# Patient Record
Sex: MALE | Race: WHITE | Employment: FULL TIME | URBAN - METROPOLITAN AREA
[De-identification: names, ages, dates, MRNs, and addresses within clinical notes are randomized per-mention and may not be internally consistent; named-entity substitution may affect disease eponyms.]

---

## 2020-01-20 ENCOUNTER — HOSPITAL ENCOUNTER (OUTPATIENT)
Dept: PHYSICAL THERAPY | Age: 35
Discharge: HOME | End: 2020-01-20
Payer: COMMERCIAL

## 2020-01-20 PROCEDURE — 97140 MANUAL THERAPY 1/> REGIONS: CPT | Performed by: PHYSICAL THERAPIST

## 2020-01-20 PROCEDURE — 97535 SELF CARE MNGMENT TRAINING: CPT | Performed by: PHYSICAL THERAPIST

## 2020-01-20 PROCEDURE — 97161 PT EVAL LOW COMPLEX 20 MIN: CPT | Performed by: PHYSICAL THERAPIST

## 2020-01-20 NOTE — PROGRESS NOTES
[facility] PHYSICAL THERAPY  [address] [place] [address], [address],[place], [address] - Phone: [phone]  Fax: [phone] OF CARE / [address] [facility]  Patient Name: Sachin Curry : 1985   Medical   Diagnosis: S/p R ACLR via BTB Treatment Diagnosis: Right knee pain [M25.561]   Onset Date: 20     Referral Source: Gabrielle Ana Start of Care [facility]): 2020   Prior Hospitalization: See medical history Provider #: [id number]   Prior Level of Function: full   Comorbidities: Alcohol use, tobacco use   Medications: Verified on Patient Summary List   The Plan of Care and following information is based on the information from the initial evaluation.   ===========================================================================================  Assessment / key information:  30 M arrives to clinic s/p knee surgery secondary injuring knee playing kickball in . Arrives to session in brace, B crutches NWB with ace bandage. Reports compliance with pain meds but not performing exercises. Pain levels 3-10/10 increasing with movement. arom-3-15P!, poor volitional quad set, significant bruising all belong medial thigh and ankle. All other special tests deferred.  Will attempt PT per protocol  ===========================================================================================  Eval Complexity: History HIGH Complexity :3+ comorbidities / personal factors will impact the outcome/ POC ;  Examination  HIGH Complexity : 4+ Standardized tests and measures addressing body structure, function, activity limitation and / or participation in recreation ; Presentation LOW Complexity : Stable, uncomplicated ;  Decision Making HIGH Complexity : FOTO score of 1- 25 ; Overall Complexity LOW   Problem List: pain affecting function, decrease ROM, decrease strength, edema affecting function, impaired gait/ balance, decrease ADL/ functional abilitiies, decrease activity tolerance, decrease flexibility/ joint mobility and decrease transfer abilities   Treatment Plan may include any combination of the following: Therapeutic exercise, Therapeutic activities, Neuromuscular re-education, Physical agent/modality, Gait/balance training, Manual therapy, Aquatic therapy, Patient education, Self Care training, Functional mobility training, Home safety training and Stair training  Patient / Family readiness to learn indicated by: asking questions, trying to perform skills and interest  Persons(s) to be included in education: patient (P) and family support person (FSP);list girlfriend  Barriers to Learning/Limitations: yes;  financial  Measures taken, if barriers to learning: Reduce frequency of visits   Patient Goal (s): Decrease pain, increase rom    Patient self reported health status: excellent  Rehabilitation Potential: good   Short Term Goals: To be accomplished in  2  weeks:  1. Pt will be compliant with h20  2. Pt will increase knee arom 0-65 to A with mobility   3. Pt will note daily max pain </=7/10 in order to increase participation in [address]: To be accomplished in  4  weeks:  1. Pt will increase knee flexion >/=110 to A with mobility  2. Pt will decrease daily max pain </=5/10 in order to increase participation in adls  3. Pt will increase FOTO by 18 points in order to show functional improvement   Frequency / Duration:   Patient to be seen  2  times per week for 4  weeks:  Patient / Caregiver education and instruction: self care, activity modification, brace/ splint application and exercises  Therapist Signature: Yaneth Avalos DPT, MTC  Date: 9/06/7101   Certification Period: na Time: 12:42 PM   ===========================================================================================  I certify that the above Physical Therapy Services are being furnished while the patient is under my care.   I agree with the treatment plan and certify that this therapy is necessary. Physician Signature:        Date:       Time:     Please sign and return to [facility] at [facility], [facility]. or you may fax the signed copy to [phone]. Thank you.

## 2020-01-20 NOTE — PROGRESS NOTES
Syed Young   PHYSICAL THERAPY - DAILY TREATMENT NOTE    Patient Name: Zainab Mode        Date: 2020  : 1985   yes Patient  Verified  Visit #:   1     Insurance: Payor: Jannet Barth / Plan: [id number] HMO/CHOICE PLUS/POS / Product Type: HMO /      In time: 1100 Out time: 1140   Total Treatment Time: 40     Medicare/BCBS Time Tracking (below)   Total Timed Codes (min):  na 1:1 Treatment Time:  na     TREATMENT AREA =  Right knee pain [M25.561]    SUBJECTIVE  Pain Level (on 0 to 10 scale):  9  / 10   Medication Changes/New allergies or changes in medical history, any new surgeries or procedures?    no  If yes, update Summary List   Subjective Functional Status/Changes:  []  No changes reported     See ie          OBJECTIVE      10 min Manual Therapy: Retrograde massage, pat mobs all directions, knee prom   Rationale:      increase ROM, increase tissue extensibility and decrease edema  to improve patient's ability to bend knee        10 min Self Care: Hep, brace and ace wrap use, wb and crutch use   Rationale:    increase ROM and increase strength to improve the patients ability to complete adls    Billed With/As:   [] TE   [] TA   [] Neuro   [] Self Care Patient Education: [x] Review HEP    [] Progressed/Changed HEP based on:   [] positioning   [] body mechanics   [] transfers   [] heat/ice application    [] other:      Other Objective/Functional Measures:    SC: girlfriend provided verbal understanding, pt performed hep with cues on form     Post Treatment Pain Level (on 0 to 10) scale:   9  / 10     ASSESSMENT  Assessment/Changes in Function:     See ie     []  See Progress Note/Recertification   Patient will continue to benefit from skilled PT services to modify and progress therapeutic interventions, address functional mobility deficits, address ROM deficits, address strength deficits, analyze and address soft tissue restrictions, analyze and cue movement patterns, analyze and modify body mechanics/ergonomics, assess and modify postural abnormalities, address imbalance/dizziness and instruct in home and community integration to attain remaining goals.    Progress toward goals / Updated goals:    See ie     PLAN  []  Upgrade activities as tolerated yes Continue plan of care   []  Discharge due to :    []  Other:      Therapist: Jesus Ornelas PT    Date: 1/20/2020 Time: 12:38 PM     Future Appointments   Date Time Provider Mariela Devlin   1/24/2020  8:30 AM Nevaeh Duarte, PT [facility]   1/29/2020 11:00 AM Nevaeh Duarte, PT [facility]   2/3/2020  2:30 PM [facility]   2/6/2020  2:30 PM [facility]   2/10/2020  2:30 PM [facility]   2/12/2020 10:00 AM Nevaeh Duarte, PT [facility]   2/17/2020 10:00 AM Nevaeh Duarte, PT [facility]   2/19/2020 10:30 AM Josué Germain, PT [facility]

## 2020-01-23 ENCOUNTER — HOSPITAL ENCOUNTER (OUTPATIENT)
Dept: PHYSICAL THERAPY | Age: 35
Discharge: HOME | End: 2020-01-23
Payer: COMMERCIAL

## 2020-01-23 PROCEDURE — 97014 ELECTRIC STIMULATION THERAPY: CPT | Performed by: PHYSICAL THERAPIST

## 2020-01-23 PROCEDURE — 97110 THERAPEUTIC EXERCISES: CPT | Performed by: PHYSICAL THERAPIST

## 2020-01-23 PROCEDURE — 97140 MANUAL THERAPY 1/> REGIONS: CPT | Performed by: PHYSICAL THERAPIST

## 2020-01-23 NOTE — PROGRESS NOTES
[facility] PHYSICAL THERAPY - DAILY TREATMENT NOTE    Patient Name: Columba Ventura        Date: 2020  : 1985   yes Patient  Verified  Visit #:   2     Insurance: Payor: Luba Mccormick / Plan: [employer] HMO/CHOICE PLUS/POS / Product Type: HMO /      In time: 200 Out time: 310   Total Treatment Time: 70     Medicare/BCBS Time Tracking (below)   Total Timed Codes (min):  na 1:1 Treatment Time:  na     TREATMENT AREA =  Right knee pain [M25.561]    SUBJECTIVE  Pain Level (on 0 to 10 scale):  4  / 10   Medication Changes/New allergies or changes in medical history, any new surgeries or procedures?    no  If yes, update Summary List   Subjective Functional Status/Changes:  []  No changes reported     I have iced hourly and took off the compression stocking and I have felt much better.  I have been doing the exrecises           OBJECTIVE  Modalities Rationale:     decrease pain, increase tissue extensibility and increase muscle contraction/control to improve patient's ability to complete adls   10 min [x] Estim, type/location: [facility] 4:12                                     []  att     [x]  unatt     []  w/US     [x]  w/ice    []  w/heat    min []  Mechanical Traction: type/lbs                   []  pro   []  sup   []  int   []  cont    []  before manual    []  after manual    min []  Ultrasound, settings/location:      min []  Iontophoresis w/ dexamethasone, location:                                               []  take home patch       []  in clinic    min []  Ice     []  Heat    location/position:     min []  Vasopneumatic Device, press/temp:     min []  Other:    [x] Skin assessment post-treatment (if applicable):    [x]  intact    []  redness- no adverse reaction     []redness  adverse reaction:        35 min Therapeutic Exercise:  [x]  See flow sheet   Rationale:      increase ROM, increase strength, improve coordination, improve balance and increase proprioception to improve the patients ability to complete adls     25 min Manual Therapy: Knee prom, pat mobs all directions, stm anterior knee and quad   Rationale:      decrease pain, increase ROM, increase tissue extensibility and decrease trigger points to improve patient's ability to complete adls         Billed With/As:   [] TE   [] TA   [] Neuro   [] Self Care Patient Education: [x] Review HEP    [] Progressed/Changed HEP based on:   [] positioning   [] body mechanics   [] transfers   [] heat/ice application    [] other:      Other Objective/Functional Measures:    Mt, te followed by estim,   ttp with knee flex and end range extReduced in bruising along medial thigh and edema in gastroc  Performed all standing te in brace locked at o     Post Treatment Pain Level (on 0 to 10) scale:   2  / 10     ASSESSMENT  Assessment/Changes in Function:     No increase in pain following initial session     []  See Progress Note/Recertification   Patient will continue to benefit from skilled PT services to modify and progress therapeutic interventions, address functional mobility deficits, address ROM deficits, address strength deficits, analyze and address soft tissue restrictions, analyze and cue movement patterns, analyze and modify body mechanics/ergonomics, assess and modify postural abnormalities and instruct in home and community integration to attain remaining goals.    Progress toward goals / Updated goals:    Compliant with hep     PLAN  []  Upgrade activities as tolerated yes Continue plan of care   []  Discharge due to :    []  Other:      Therapist: Lizy Benjamin PT    Date: 1/23/2020 Time: 1:56 PM     Future Appointments   Date Time Provider Mariela Devlin   1/23/2020  2:30 PM [facility]   1/29/2020 11:00 AM Austin Smith PT [facility]   [facility]2020  [facility]   2/6/2020  2:30 PM [facility]   2/10/2020  2:30 PM Austin Smith PT [facility]   [facility]   2/17/2020 10:00 AM Nevaeh Duarte, PT [facility]   2/19/2020 10:30 AM Josué Germain, PT [facility]

## 2020-01-24 ENCOUNTER — APPOINTMENT (OUTPATIENT)
Dept: PHYSICAL THERAPY | Age: 35
End: 2020-01-24
Payer: COMMERCIAL

## 2020-01-29 ENCOUNTER — HOSPITAL ENCOUNTER (OUTPATIENT)
Dept: PHYSICAL THERAPY | Age: 35
Discharge: HOME | End: 2020-01-29
Payer: COMMERCIAL

## 2020-01-29 PROCEDURE — 97140 MANUAL THERAPY 1/> REGIONS: CPT | Performed by: PHYSICAL THERAPIST

## 2020-01-29 PROCEDURE — 97110 THERAPEUTIC EXERCISES: CPT | Performed by: PHYSICAL THERAPIST

## 2020-01-29 PROCEDURE — 97014 ELECTRIC STIMULATION THERAPY: CPT | Performed by: PHYSICAL THERAPIST

## 2020-01-29 NOTE — PROGRESS NOTES
Salina Baker   PHYSICAL THERAPY - DAILY TREATMENT NOTE    Patient Name: Lidia Lomeli        Date: 2020  : 1985   yes Patient  Verified  Visit #:   3     Insurance: Payor: Denis Lloyd / Plan: [employer] HMO/CHOICE PLUS/POS / Product Type: HMO /      In time: 1218 Out time: 7610   Total Treatment Time: 63     Medicare/BCBS Time Tracking (below)   Total Timed Codes (min):  na 1:1 Treatment Time:  na     TREATMENT AREA =  Right knee pain [M25.561]    SUBJECTIVE  Pain Level (on 0 to 10 scale):  2  / 10   Medication Changes/New allergies or changes in medical history, any new surgeries or procedures?    no  If yes, update Summary List   Subjective Functional Status/Changes:  []  No changes reported   I saw doctor nya and she removed the original bandaging and she said to keep my brace locked out until I see her next          OBJECTIVE  Modalities Rationale:     decrease pain, increase tissue extensibility and increase muscle contraction/control to improve patient's ability to complete adls   10 min [x] Estim, type/location: [place] 4:12                                     []  att     [x]  unatt     []  w/US     [x]  w/ice    []  w/heat    min []  Mechanical Traction: type/lbs                   []  pro   []  sup   []  int   []  cont    []  before manual    []  after manual    min []  Ultrasound, settings/location:      min []  Iontophoresis w/ dexamethasone, location:                                               []  take home patch       []  in clinic    min []  Ice     []  Heat    location/position:     min []  Vasopneumatic Device, press/temp:     min []  Other:    [x] Skin assessment post-treatment (if applicable):    [x]  intact    []  redness- no adverse reaction     []redness  adverse reaction:        33 min Therapeutic Exercise:  [x]  See flow sheet   Rationale:      increase ROM, increase strength, improve coordination, improve balance and increase proprioception to improve the patients ability to complete adls     20 min Manual Therapy: Knee prom, pat mobs all directions, stm anterior knee and quad   Rationale:      decrease pain, increase ROM, increase tissue extensibility and decrease trigger points to improve patient's ability to complete adls         Billed With/As:   [] TE   [] TA   [] Neuro   [] Self Care Patient Education: [x] Review HEP    [] Progressed/Changed HEP based on:   [] positioning   [] body mechanics   [] transfers   [] heat/ice application    [] other:      Other Objective/Functional Measures:    mt followed by te and ice with estim  aarom 58 degree knee flexion     Post Treatment Pain Level (on 0 to 10) scale:   0 / 10     ASSESSMENT  Assessment/Changes in Function:     No increase in pain following session      []  See Progress Note/Recertification   Patient will continue to benefit from skilled PT services to modify and progress therapeutic interventions, address functional mobility deficits, address ROM deficits, address strength deficits, analyze and address soft tissue restrictions, analyze and cue movement patterns, analyze and modify body mechanics/ergonomics, assess and modify postural abnormalities and instruct in home and community integration to attain remaining goals.    Progress toward goals / Updated goals:  Progress towards rom goal       PLAN  []  Upgrade activities as tolerated yes Continue plan of care   []  Discharge due to :    []  Other:      Therapist: Kimberly Dowell PT    Date: 1/29/2020 Time: 1:56 PM     Future Appointments   Date Time Provider Mariela Devlin   1/29/2020 11:00 AM Savi Begum, PT [facility]   2/3/2020  2:30 PM [facility]   2/6/2020  2:30 PM [facility]   2/10/2020  2:30 PM [facility]   2/12/2020 10:00 AM Savi Begum, PT [facility]   2/17/2020 10:00 AM Savi Begum, PT [facility]   2/19/2020 10:30 AM Holly Germain, PT [facility]

## 2020-01-30 ENCOUNTER — HOSPITAL ENCOUNTER (OUTPATIENT)
Dept: PHYSICAL THERAPY | Age: 35
Discharge: HOME | End: 2020-01-30
Payer: COMMERCIAL

## 2020-01-30 PROCEDURE — 97140 MANUAL THERAPY 1/> REGIONS: CPT | Performed by: PHYSICAL THERAPIST

## 2020-01-30 PROCEDURE — 97110 THERAPEUTIC EXERCISES: CPT | Performed by: PHYSICAL THERAPIST

## 2020-01-30 PROCEDURE — 97014 ELECTRIC STIMULATION THERAPY: CPT | Performed by: PHYSICAL THERAPIST

## 2020-01-30 NOTE — PROGRESS NOTES
David Thao   PHYSICAL THERAPY - DAILY TREATMENT NOTE    Patient Name: Della Phalen        Date: 2020  : 1985   yes Patient  Verified  Visit #:   4     Insurance: Payor: Moriah Rodrigues / Plan: [employer] HMO/CHOICE PLUS/POS / Product Type: HMO /      In time: 901 Out time: 318   Total Treatment Time: 70     Medicare/BCBS Time Tracking (below)   Total Timed Codes (min):  na 1:1 Treatment Time:  na     TREATMENT AREA =  Right knee pain [M25.561]    SUBJECTIVE  Pain Level (on 0 to 10 scale):  1  / 10   Medication Changes/New allergies or changes in medical history, any new surgeries or procedures?    no  If yes, update Summary List   Subjective Functional Status/Changes:  []  No changes reported   Doing my exercises daily and they are getting better         OBJECTIVE  Modalities Rationale:     decrease pain, increase tissue extensibility and increase muscle contraction/control to improve patient's ability to complete adls   10 min [x] Estim[facility] 4:12                                     []  att     [x]  unatt     []  w/US     [x]  w/ice    []  w/heat    min []  Mechanical Traction: type/lbs                   []  pro   []  sup   []  int   []  cont    []  before manual    []  after manual    min []  Ultrasound, settings/location:      min []  Iontophoresis w/ dexamethasone, location:                                               []  take home patch       []  in clinic    min []  Ice     []  Heat    location/position:     min []  Vasopneumatic Device, press/temp:     min []  Other:    [x] Skin assessment post-treatment (if applicable):    [x]  intact    []  redness- no adverse reaction     []redness  adverse reaction:        40 min Therapeutic Exercise:  [x]  See flow sheet   Rationale:      increase ROM, increase strength, improve coordination, improve balance and increase proprioception to improve the patients ability to complete adls     20 min Manual Therapy: Knee prom, pat mobs all directions, stm anterior knee and quad   Rationale:      decrease pain, increase ROM, increase tissue extensibility and decrease trigger points to improve patient's ability to complete adls         Billed With/As:   [] TE   [] TA   [] Neuro   [] Self Care Patient Education: [x] Review HEP    [] Progressed/Changed HEP based on:   [] positioning   [] body mechanics   [] transfers   [] heat/ice application    [] other:      Other Objective/Functional Measures:    Increased wb te as per flow sheet - use of band to perform tke without pain reported  Denied tenderness with scar massage  sls 10\" without pain      Post Treatment Pain Level (on 0 to 10) scale:   0 / 10     ASSESSMENT  Assessment/Changes in Function:     No increase in pain following session      []  See Progress Note/Recertification   Patient will continue to benefit from skilled PT services to modify and progress therapeutic interventions, address functional mobility deficits, address ROM deficits, address strength deficits, analyze and address soft tissue restrictions, analyze and cue movement patterns, analyze and modify body mechanics/ergonomics, assess and modify postural abnormalities and instruct in home and community integration to attain remaining goals.    Progress toward goals / Updated goals:  Progress towards rom goal       PLAN  []  Upgrade activities as tolerated yes Continue plan of care   []  Discharge due to :    []  Other:      Therapist: Carlos Moyer PT    Date: 1/30/2020 Time: 1:56 PM     Future Appointments   Date Time Provider Mraiela Devlin   2/3/2020  2:30 PM [facility]   2/6/2020  2:30 PM [facility]   [facility]   2/12/2020 10:00 AM Suma Michael, PT [facility]   2/17/2020 10:00 AM Suma Michael, PT [facility]   2/19/2020 10:30 AM Sharron Germain, PT [facility]

## 2020-02-03 ENCOUNTER — HOSPITAL ENCOUNTER (OUTPATIENT)
Dept: PHYSICAL THERAPY | Age: 35
Discharge: HOME | End: 2020-02-03
Payer: COMMERCIAL

## 2020-02-03 PROCEDURE — 97110 THERAPEUTIC EXERCISES: CPT | Performed by: PHYSICAL THERAPIST

## 2020-02-03 PROCEDURE — 97014 ELECTRIC STIMULATION THERAPY: CPT | Performed by: PHYSICAL THERAPIST

## 2020-02-03 PROCEDURE — 97140 MANUAL THERAPY 1/> REGIONS: CPT | Performed by: PHYSICAL THERAPIST

## 2020-02-03 NOTE — PROGRESS NOTES
Aileen Farley   PHYSICAL THERAPY - DAILY TREATMENT NOTE    Patient Name: Guillermo Mendoza        Date: 2/3/2020  : 1985   yes Patient  Verified  Visit #:   5     Insurance: Payor: Reji Pillai / Plan: [employer] HMO/CHOICE PLUS/POS / Product Type: HMO /      In time: 634 Out time: 315   Total Treatment Time: 55     Medicare/BCBS Time Tracking (below)   Total Timed Codes (min):  na 1:1 Treatment Time:  na     TREATMENT AREA =  Right knee pain [M25.561]    SUBJECTIVE  Pain Level (on 0 to 10 scale):  0  / 10   Medication Changes/New allergies or changes in medical history, any new surgeries or procedures?    no  If yes, update Summary List   Subjective Functional Status/Changes:  []  No changes reported   I have actually not used the crutches since I was last in and its feeling good          OBJECTIVE  Modalities Rationale:     decrease pain, increase tissue extensibility and increase muscle contraction/control to improve patient's ability to complete adls   10 min [x] Estim, type/location: [facility] 4:12                                     []  att     [x]  unatt     []  w/US     [x]  w/ice    []  w/heat    min []  Mechanical Traction: type/lbs                   []  pro   []  sup   []  int   []  cont    []  before manual    []  after manual    min []  Ultrasound, settings/location:      min []  Iontophoresis w/ dexamethasone, location:                                               []  take home patch       []  in clinic    min []  Ice     []  Heat    location/position:     min []  Vasopneumatic Device, press/temp:     min []  Other:    [x] Skin assessment post-treatment (if applicable):    [x]  intact    []  redness- no adverse reaction     []redness  adverse reaction:        30 min Therapeutic Exercise:  [x]  See flow sheet   Rationale:      increase ROM, increase strength, improve coordination, improve balance and increase proprioception to improve the patients ability to complete adls     15 min Manual Therapy: Knee prom, pat mobs all directions, stm anterior knee and quad   Rationale:      decrease pain, increase ROM, increase tissue extensibility and decrease trigger points to improve patient's ability to complete adls         Billed With/As:   [] TE   [] TA   [] Neuro   [] Self Care Patient Education: [x] Review HEP    [] Progressed/Changed HEP based on:   [] positioning   [] body mechanics   [] transfers   [] heat/ice application    [] other:      Other Objective/Functional Measures:  Incision uncovered dry, intact with normal healing rate noted  0-90 prom without guarding  Progressed te as per flow sheet    Post Treatment Pain Level (on 0 to 10) scale:   0 / 10     ASSESSMENT  Assessment/Changes in Function:     No increase in pain following program    []  See Progress Note/Recertification   Patient will continue to benefit from skilled PT services to modify and progress therapeutic interventions, address functional mobility deficits, address ROM deficits, address strength deficits, analyze and address soft tissue restrictions, analyze and cue movement patterns, analyze and modify body mechanics/ergonomics, assess and modify postural abnormalities and instruct in home and community integration to attain remaining goals. Progress toward goals / Updated goals:     Will perform foto next session in order to assess progress towards goal      PLAN  []  Upgrade activities as tolerated yes Continue plan of care   []  Discharge due to :    []  Other:      Therapist: León Dye PT    Date: 2/3/2020 Time: 1:56 PM     Future Appointments   Date Time Provider Mariela Devlin   2/3/2020  2:30 PM [facility]   2/6/2020  2:30 PM Mary Jo [facility]   2/10/2020  2:30 PM Mary Jo [facility]   2/12/2020 10:00 AM Sugar Yoo, PT [facility]   2/17/2020 10:00 AM Sugar Yoo, PT [facility]   2/19/2020 10:30 AM Ryan Germain, PT [facility]

## 2020-02-06 ENCOUNTER — HOSPITAL ENCOUNTER (OUTPATIENT)
Dept: PHYSICAL THERAPY | Age: 35
Discharge: HOME | End: 2020-02-06
Payer: COMMERCIAL

## 2020-02-06 PROCEDURE — 97110 THERAPEUTIC EXERCISES: CPT | Performed by: PHYSICAL THERAPIST

## 2020-02-06 PROCEDURE — 97014 ELECTRIC STIMULATION THERAPY: CPT | Performed by: PHYSICAL THERAPIST

## 2020-02-06 PROCEDURE — 97140 MANUAL THERAPY 1/> REGIONS: CPT | Performed by: PHYSICAL THERAPIST

## 2020-02-06 NOTE — PROGRESS NOTES
Adelia Adams   PHYSICAL THERAPY - DAILY TREATMENT NOTE    Patient Name: Andrés Arboleda        Date: 2020  : 1985   yes Patient  Verified  Visit #:     Insurance: Payor: Kelbycabrera Siu / Plan: [employer] HMO/CHOICE PLUS/POS / Product Type: HMO /      In time: 036 Out time: 400   Total Treatment Time: 70     Medicare/BCBS Time Tracking (below)   Total Timed Codes (min):  na 1:1 Treatment Time:  na     TREATMENT AREA =  Right knee pain [M25.561]    SUBJECTIVE  Pain Level (on 0 to 10 scale):  0  / 10   Medication Changes/New allergies or changes in medical history, any new surgeries or procedures?    no  If yes, update Summary List   Subjective Functional Status/Changes:  []  No changes reported   I can go up the stairs one foot at a time but still cannot do that going down the stairs          OBJECTIVE  Modalities Rationale:     decrease pain, increase tissue extensibility and increase muscle contraction/control to improve patient's ability to complete adls   10 min [x] Estim, type/location: [facility] 4:12                                     []  att     [x]  unatt     []  w/US     [x]  w/ice    []  w/heat    min []  Mechanical Traction: type/lbs                   []  pro   []  sup   []  int   []  cont    []  before manual    []  after manual    min []  Ultrasound, settings/location:      min []  Iontophoresis w/ dexamethasone, location:                                               []  take home patch       []  in clinic    min []  Ice     []  Heat    location/position:     min []  Vasopneumatic Device, press/temp:     min []  Other:    [x] Skin assessment post-treatment (if applicable):    [x]  intact    []  redness- no adverse reaction     []redness  adverse reaction:        45 min Therapeutic Exercise:  [x]  See flow sheet   Rationale:      increase ROM, increase strength, improve coordination, improve balance and increase proprioception to improve the patients ability to complete adls     15 min Manual Therapy: Knee prom, pat mobs all directions, stm anterior knee and quad, hs and gastroc  release   Rationale:      decrease pain, increase ROM, increase tissue extensibility and decrease trigger points to improve patient's ability to complete adls         Billed With/As:   [] TE   [] TA   [] Neuro   [] Self Care Patient Education: [x] Review HEP    [] Progressed/Changed HEP based on:   [] positioning   [] body mechanics   [] transfers   [] heat/ice application    [] other:      Other Objective/Functional Measures:  Progressed te as per flow sheet to include balance drills on multiple level surfaces  Pt brace consistently fell with hinge below knee joint providing difficulty bending knee   Palpable trp along lateral hs and proximal lateral gastroc   Post Treatment Pain Level (on 0 to 10) scale:   0 / 10     ASSESSMENT  Assessment/Changes in Function:     No increase in pain following program    []  See Progress Note/Recertification   Patient will continue to benefit from skilled PT services to modify and progress therapeutic interventions, address functional mobility deficits, address ROM deficits, address strength deficits, analyze and address soft tissue restrictions, analyze and cue movement patterns, analyze and modify body mechanics/ergonomics, assess and modify postural abnormalities and instruct in home and community integration to attain remaining goals. Progress toward goals / Updated goals:     Max pain 3/10 - stg #3 achieved      PLAN  []  Upgrade activities as tolerated yes Continue plan of care   []  Discharge due to :    []  Other:      Therapist: Supriya Laguerre PT    Date: 2/6/2020 Time: 1:56 PM     Future Appointments   Date Time Provider Mariela Devlin   2/10/2020  2:30 PM Luann Record [facility]   2/12/2020 10:00 AM Sivakumar Horn, PT [facility]   2/17/2020 10:00 AM Sivakumar Horn PT [facility]   2/19/2020 10:30 AM Hartzog, Rosina Shone, PT [facility]

## 2020-02-10 ENCOUNTER — HOSPITAL ENCOUNTER (OUTPATIENT)
Dept: PHYSICAL THERAPY | Age: 35
Discharge: HOME | End: 2020-02-10
Payer: COMMERCIAL

## 2020-02-10 PROCEDURE — 97110 THERAPEUTIC EXERCISES: CPT | Performed by: PHYSICAL THERAPIST

## 2020-02-10 PROCEDURE — 97140 MANUAL THERAPY 1/> REGIONS: CPT | Performed by: PHYSICAL THERAPIST

## 2020-02-10 PROCEDURE — 97014 ELECTRIC STIMULATION THERAPY: CPT | Performed by: PHYSICAL THERAPIST

## 2020-02-10 NOTE — PROGRESS NOTES
.[facility] PHYSICAL THERAPY  [address] [place] [address],[facility], [facility] - Phone: [phone]  Fax: [phone]  PROGRESS NOTE  Patient Name: Lui Kruse : 1985   Treatment/Medical Diagnosis: Right knee pain [M25.561]   Referral Source: Victor M Gonzales*     Date of Initial Visit: 20 Attended Visits: 7 Missed Visits: 0     SUMMARY OF TREATMENT  Pt seen for IE and 6 f/u visits with treatment consisting of therapeutic exercises for knee rom/LE strengthening, manual therapy (prom/mobs/stm), gait/balance training and modalities prn. In addition pt was instructed on hep/activity modification. CURRENT STATUS  Pt has made good progress with PT thus far at approximately one month post op. Arom/prom -2/0 - 98/106 and is able to perform SLR without lag. Continuing to ambulate all distances with brace locked into extension until f/u in your office in 2 weeks. Goal/Measure of Progress Goal Met? 1. Pt will increase knee flexion >/=110 to A with mobility    Status at last Eval: -3-15 Current Status: 0-106 progressing   2. Pt will decrease daily max pain </=5/10 in order to increase participation in adls   Status at last Eval: 10/10 Current Status: 2/10 yes   3. Pt will increase FOTO by 18 points in order to show functional improvement    Status at last Eval:  Current Status: 58/100 yes     New Goals to be achieved in __4__  weeks:  1. Pt will be able to perform 2x10-15 reps four inch lateral step downs in order to improve eccentric quad strength   2. Pt will ambulate community distances with symmetrical gait pattern   3. Achieve goal #1  RECOMMENDATIONS  Continue therapy an additional 2x/4 weeks to progressing into next phase of protocol   If you have any questions/comments please contact us directly at [phone]. Thank you for allowing us to assist in the care of your patient.     Therapist Signature: Carlos Moyer, PT Date: 2/10/2020 Time: 12:49 PM   NOTE TO PHYSICIAN:  PLEASE COMPLETE THE ORDERS BELOW AND FAX TO   [facility]: [phone]  If you are unable to process this request in 24 hours please contact our office: [phone]    ___ I have read the above report and request that my patient continue as recommended.   ___ I have read the above report and request that my patient continue therapy with the following changes/special instructions:_________________________________________________________   ___ I have read the above report and request that my patient be discharged from therapy.      Physician Signature:        Date:       Time:

## 2020-02-10 NOTE — PROGRESS NOTES
Syed Young   PHYSICAL THERAPY - DAILY TREATMENT NOTE    Patient Name: Zainab Mode        Date: 2/10/2020  : 1985   yes Patient  Verified  Visit #:   7    Insurance: Payor: Jannet Barth / Plan: [employer] HMO/CHOICE PLUS/POS / Product Type: HMO /      In time: 233 Out time: 343   Total Treatment Time: 67     Medicare/BCBS Time Tracking (below)   Total Timed Codes (min):  na 1:1 Treatment Time:  na     TREATMENT AREA =  Right knee pain [M25.561]    SUBJECTIVE  Pain Level (on 0 to 10 scale):  0  / 10   Medication Changes/New allergies or changes in medical history, any new surgeries or procedures?    no  If yes, update Summary List   Subjective Functional Status/Changes:  []  No changes reported   See pn          OBJECTIVE  Modalities Rationale:     decrease pain, increase tissue extensibility and increase muscle contraction/control to improve patient's ability to complete adls   10 min [x] Estim, type/location: [place] 4:12                                     []  att     [x]  unatt     []  w/US     [x]  w/ice    []  w/heat    min []  Mechanical Traction: type/lbs                   []  pro   []  sup   []  int   []  cont    []  before manual    []  after manual    min []  Ultrasound, settings/location:      min []  Iontophoresis w/ dexamethasone, location:                                               []  take home patch       []  in clinic    min []  Ice     []  Heat    location/position:     min []  Vasopneumatic Device, press/temp:     min []  Other:    [x] Skin assessment post-treatment (if applicable):    [x]  intact    []  redness- no adverse reaction     []redness  adverse reaction:        42 min Therapeutic Exercise:  [x]  See flow sheet   Rationale:      increase ROM, increase strength, improve coordination, improve balance and increase proprioception to improve the patients ability to complete adls     15 min Manual Therapy: Knee prom, pat mobs all directions, stm anterior knee and quad, hs and gastroc  release   Rationale:      decrease pain, increase ROM, increase tissue extensibility and decrease trigger points to improve patient's ability to complete adls         Billed With/As:   [] TE   [] TA   [] Neuro   [] Self Care Patient Education: [x] Review HEP    [] Progressed/Changed HEP based on:   [] positioning   [] body mechanics   [] transfers   [] heat/ice application    [] other:      Other Objective/Functional Measures:  See pn    Post Treatment Pain Level (on 0 to 10) scale:   0 / 10     ASSESSMENT  Assessment/Changes in Function:     See pn    []  See Progress Note/Recertification   Patient will continue to benefit from skilled PT services to modify and progress therapeutic interventions, address functional mobility deficits, address ROM deficits, address strength deficits, analyze and address soft tissue restrictions, analyze and cue movement patterns, analyze and modify body mechanics/ergonomics, assess and modify postural abnormalities and instruct in home and community integration to attain remaining goals.    Progress toward goals / Updated goals:    See pn      PLAN  []  Upgrade activities as tolerated yes Continue plan of care   []  Discharge due to :    []  Other:      Therapist: Padmini Howard PT    Date: 2/10/2020 Time: 1:56 PM     Future Appointments   Date Time Provider Mariela Devlin   2/12/2020 10:00 AM Karley Alexis, PT [facility]   2/17/2020 10:00 AM Karley Alexis, PT [facility]   2/19/2020 10:30 AM Gt Germain, PT [facility]

## 2020-02-12 ENCOUNTER — HOSPITAL ENCOUNTER (OUTPATIENT)
Dept: PHYSICAL THERAPY | Age: 35
Discharge: HOME | End: 2020-02-12
Payer: COMMERCIAL

## 2020-02-12 PROCEDURE — 97140 MANUAL THERAPY 1/> REGIONS: CPT | Performed by: PHYSICAL THERAPIST

## 2020-02-12 PROCEDURE — 97110 THERAPEUTIC EXERCISES: CPT | Performed by: PHYSICAL THERAPIST

## 2020-02-12 NOTE — PROGRESS NOTES
Klaus Hanna   PHYSICAL THERAPY - DAILY TREATMENT NOTE    Patient Name: Ellen Cam        Date: 2020  : 1985   yes Patient  Verified  Visit #:   8   of   16  Insurance: Payor: Neha Ren / Plan: [employer] HMO/CHOICE PLUS/POS / Product Type: HMO /      In time: 249 Out time: 1100   Total Treatment Time: 65     Medicare/BCBS Time Tracking (below)   Total Timed Codes (min):  na 1:1 Treatment Time:  na     TREATMENT AREA =  Right knee pain [M25.561]    SUBJECTIVE  Pain Level (on 0 to 10 scale):  0  / 10   Medication Changes/New allergies or changes in medical history, any new surgeries or procedures?    no  If yes, update Summary List   Subjective Functional Status/Changes:  []  No changes reported     I took it easy with all the knee bending and the front part of my knee has calmed down          OBJECTIVE  Modalities Rationale:     decrease pain and increase tissue extensibility to improve patient's ability to compliant with hep   min [] Estim, type/location:                                      []  att     []  unatt     []  w/US     []  w/ice    []  w/heat    min []  Mechanical Traction: type/lbs                   []  pro   []  sup   []  int   []  cont    []  before manual    []  after manual    min []  Ultrasound, settings/location:      min []  Iontophoresis w/ dexamethasone, location:                                               []  take home patch       []  in clinic   10 min [x]  Ice     []  Heat    location/position: Long sit    min []  Vasopneumatic Device, press/temp:     min []  Other:    [x] Skin assessment post-treatment (if applicable):    [x]  intact    []  redness- no adverse reaction     []redness  adverse reaction:        40 min Therapeutic Exercise:  [x]  See flow sheet   Rationale:      increase ROM, increase strength, improve coordination, improve balance and increase proprioception to improve the patients ability to complete adls     15 min Manual Therapy: Knee prom, pat sup/inf mobs, graston release hs and gastroc    Rationale:      decrease pain, increase ROM, increase tissue extensibility and decrease trigger points to improve patient's ability to complete adls       Billed With/As:   [] TE   [] TA   [] Neuro   [] Self Care Patient Education: [x] Review HEP    [] Progressed/Changed HEP based on:   [] positioning   [] body mechanics   [] transfers   [] heat/ice application    [] other:      Other Objective/Functional Measures:    Progressed te as per flow sheet to include lateral step downs - no medial collapse but advised to use UE for concentric portion to avoid patellar tendon pain     Post Treatment Pain Level (on 0 to 10) scale:   0  / 10     ASSESSMENT  Assessment/Changes in Function:     No increase in pain following session      []  See Progress Note/Recertification   Patient will continue to benefit from skilled PT services to modify and progress therapeutic interventions, address functional mobility deficits, address ROM deficits, address strength deficits, analyze and address soft tissue restrictions, analyze and cue movement patterns, analyze and modify body mechanics/ergonomics, assess and modify postural abnormalities, address imbalance/dizziness and instruct in home and community integration to attain remaining goals.    Progress toward goals / Updated goals:    Pt making excellent gains with pain levels and strength goals      PLAN  []  Upgrade activities as tolerated yes Continue plan of care   []  Discharge due to :    []  Other:      Therapist: Earline Taylor PT    Date: 2/12/2020 Time: 12:15 PM     Future Appointments   Date Time Provider Mariela Devlin   2/17/2020 10:00 AM Alondra Beltre PT [facility]   2/19/2020 10:30 AM Alondra Beltre PT [facility]   2/25/2020 10:30 AM Alondra Beltre, PT [facility]   2/27/2020  9:00 AM Alondra Beltre, PT [facility]   3/3/2020 10:30 AM Alondra Beltre, PT [facility]   3/5/2020 11:00 AM Sohrty Germain, PT [facility]   3/9/2020 10:30 AM Emily Altman, PT [facility]   3/11/2020 10:30 AM Blessing Germain, PT [facility]

## 2020-02-17 ENCOUNTER — HOSPITAL ENCOUNTER (OUTPATIENT)
Dept: PHYSICAL THERAPY | Age: 35
Discharge: HOME | End: 2020-02-17
Payer: COMMERCIAL

## 2020-02-17 PROCEDURE — 97140 MANUAL THERAPY 1/> REGIONS: CPT | Performed by: PHYSICAL THERAPIST

## 2020-02-17 PROCEDURE — 97110 THERAPEUTIC EXERCISES: CPT | Performed by: PHYSICAL THERAPIST

## 2020-02-17 NOTE — PROGRESS NOTES
Gabriela Resendiz   PHYSICAL THERAPY - DAILY TREATMENT NOTE    Patient Name: Karen Melton        Date: 2020  : 1985   yes Patient  Verified  Visit #:      16  Insurance: Payor: Yuly Chaudhari / Plan: [employer] HMO/CHOICE PLUS/POS / Product Type: HMO /      In time: 310 Out time: 92   Total Treatment Time: 63     Medicare/BCBS Time Tracking (below)   Total Timed Codes (min):  na 1:1 Treatment Time:  na     TREATMENT AREA =  Right knee pain [M25.561]    SUBJECTIVE  Pain Level (on 0 to 10 scale):  0  / 10   Medication Changes/New allergies or changes in medical history, any new surgeries or procedures?    no  If yes, update Summary List   Subjective Functional Status/Changes:  []  No changes reported      moved my appointment up to this Wednesday because I am feeling so good         OBJECTIVE  Modalities Rationale:     decrease pain and increase tissue extensibility to improve patient's ability to compliant with hep   min [] Estim, type/location:                                      []  att     []  unatt     []  w/US     []  w/ice    []  w/heat    min []  Mechanical Traction: type/lbs                   []  pro   []  sup   []  int   []  cont    []  before manual    []  after manual    min []  Ultrasound, settings/location:      min []  Iontophoresis w/ dexamethasone, location:                                               []  take home patch       []  in clinic   10 min [x]  Ice     []  Heat    location/position: Long sit    min []  Vasopneumatic Device, press/temp:     min []  Other:    [x] Skin assessment post-treatment (if applicable):    [x]  intact    []  redness- no adverse reaction     []redness  adverse reaction:        40 min Therapeutic Exercise:  [x]  See flow sheet   Rationale:      increase ROM, increase strength, improve coordination, improve balance and increase proprioception to improve the patients ability to complete adls     13 min Manual Therapy: Knee prom, pat sup/inf mobs, graston release hs and gastroc    Rationale:      decrease pain, increase ROM, increase tissue extensibility and decrease trigger points to improve patient's ability to complete adls       Billed With/As:   [] TE   [] TA   [] Neuro   [] Self Care Patient Education: [x] Review HEP    [] Progressed/Changed HEP based on:   [] positioning   [] body mechanics   [] transfers   [] heat/ice application    [] other:      Other Objective/Functional Measures:  Knee arom 118 without pain  Progressed te as per flow sheet with use of mirror required for step downs to avoid excessive hip hinge      Post Treatment Pain Level (on 0 to 10) scale:   0  / 10     ASSESSMENT  Assessment/Changes in Function:   No increase in pain following progression        []  See Progress Note/Recertification   Patient will continue to benefit from skilled PT services to modify and progress therapeutic interventions, address functional mobility deficits, address ROM deficits, address strength deficits, analyze and address soft tissue restrictions, analyze and cue movement patterns, analyze and modify body mechanics/ergonomics, assess and modify postural abnormalities, address imbalance/dizziness and instruct in home and community integration to attain remaining goals.    Progress toward goals / Updated goals:    Pt making excellent progress at 5 weeks post op - will continue to progress next week to next phase of protocol to include CKC te     PLAN  []  Upgrade activities as tolerated yes Continue plan of care   []  Discharge due to :    []  Other:      Therapist: Supriya Laguerre PT    Date: 2/17/2020 Time: 12:15 PM     Future Appointments   Date Time Provider Mariela Devlin   2/17/2020 10:00 AM Sivakumar Lute, PT [facility]   2/19/2020 10:30 AM Sivakumar Lute, PT [facility]   2/25/2020 10:30 AM Sivakumar Lute, PT [facility]   2/27/2020  9:00 AM Sivakumar Lute, PT [facility]   3/3/2020 10:30 AM Sivakumar Lute, PT [facility]   3/5/2020 11:00 AM Isabella Montiel [facility]   3/9/2020 10:30 AM Brayden Delgadillo, PT [facility]   3/11/2020 10:30 AM Mary Carmen Germain, PT [facility]

## 2020-02-19 ENCOUNTER — HOSPITAL ENCOUNTER (OUTPATIENT)
Dept: PHYSICAL THERAPY | Age: 35
Discharge: HOME | End: 2020-02-19
Payer: COMMERCIAL

## 2020-02-19 PROCEDURE — 97140 MANUAL THERAPY 1/> REGIONS: CPT | Performed by: PHYSICAL THERAPIST

## 2020-02-19 PROCEDURE — 97110 THERAPEUTIC EXERCISES: CPT | Performed by: PHYSICAL THERAPIST

## 2020-02-19 NOTE — PROGRESS NOTES
Antonino Low PHYSICAL THERAPY - DAILY TREATMENT NOTE    Patient Name: Parviz Meredith        Date: 2020  : 1985   yes Patient  Verified  Visit #:   10   of   16  Insurance: Payor: Carol Bazan / Plan: [employer] HMO/CHOICE PLUS/POS / Product Type: HMO /      In time: 3270 Out time: 1140   Total Treatment Time: 70     Medicare/BCBS Time Tracking (below)   Total Timed Codes (min):  na 1:1 Treatment Time:  na     TREATMENT AREA =  Right knee pain [M25.561]    SUBJECTIVE  Pain Level (on 0 to 10 scale):  0  / 10   Medication Changes/New allergies or changes in medical history, any new surgeries or procedures?    no  If yes, update Summary List   Subjective Functional Status/Changes:  []  No changes reported     Go to see the surgeon today. I am anxious to return to work.  I have been trying to straighten my knee as hard as possible       OBJECTIVE  Modalities Rationale:     decrease pain and increase tissue extensibility to improve patient's ability to compliant with hep   min [] Estim, type/location:                                      []  att     []  unatt     []  w/US     []  w/ice    []  w/heat    min []  Mechanical Traction: type/lbs                   []  pro   []  sup   []  int   []  cont    []  before manual    []  after manual    min []  Ultrasound, settings/location:      min []  Iontophoresis w/ dexamethasone, location:                                               []  take home patch       []  in clinic   10 min [x]  Ice     []  Heat    location/position: Long sit    min []  Vasopneumatic Device, press/temp:     min []  Other:    [x] Skin assessment post-treatment (if applicable):    [x]  intact    []  redness- no adverse reaction     []redness  adverse reaction:        45 min Therapeutic Exercise:  [x]  See flow sheet   Rationale:      increase ROM, increase strength, improve coordination, improve balance and increase proprioception to improve the patients ability to complete adls 15 min Manual Therapy: Knee prom, pat sup/inf mobs, graston release hs and gastroc    Rationale:      decrease pain, increase ROM, increase tissue extensibility and decrease trigger points to improve patient's ability to complete adls       Billed With/As:   [] TE   [] TA   [] Neuro   [] Self Care Patient Education: [x] Review HEP    [] Progressed/Changed HEP based on:   [] positioning   [] body mechanics   [] transfers   [] heat/ice application    [] other:      Other Objective/Functional Measures:  Increased posterior chain pres as per flow sheet with good form and no c/o pain   Able to perform tke during stance phase of gait    Post Treatment Pain Level (on 0 to 10) scale:   0  / 10     ASSESSMENT  Assessment/Changes in Function:   No increase in pain following progression        []  See Progress Note/Recertification   Patient will continue to benefit from skilled PT services to modify and progress therapeutic interventions, address functional mobility deficits, address ROM deficits, address strength deficits, analyze and address soft tissue restrictions, analyze and cue movement patterns, analyze and modify body mechanics/ergonomics, assess and modify postural abnormalities, address imbalance/dizziness and instruct in home and community integration to attain remaining goals.    Progress toward goals / Updated goals:    Pt making excellent progress at 5 weeks post op - will continue to progress next week to next phase of protocol to include CKC te     PLAN  []  Upgrade activities as tolerated yes Continue plan of care   []  Discharge due to :    []  Other:      Therapist: Mark Houser PT    Date: 2/19/2020 Time: 12:15 PM     Future Appointments   Date Time Provider Mariela Devlin   2/25/2020 10:30 AM Tay Fermin, PT [facility]   2/27/2020  9:00 AM Tay Fermin, PT [facility]   3/3/2020 10:30 AM Tay Fermin, PT [facility]   3/5/2020 11:00 AM Tay Fermin, PT [facility]   3/9/2020 10:30 AM Bridget Cluck [facility]   3/11/2020 10:30 AM Rita Germain, PT [facility]

## 2020-02-25 ENCOUNTER — HOSPITAL ENCOUNTER (OUTPATIENT)
Dept: PHYSICAL THERAPY | Age: 35
Discharge: HOME | End: 2020-02-25
Payer: COMMERCIAL

## 2020-02-25 PROCEDURE — 97110 THERAPEUTIC EXERCISES: CPT | Performed by: PHYSICAL THERAPIST

## 2020-02-25 PROCEDURE — 97140 MANUAL THERAPY 1/> REGIONS: CPT | Performed by: PHYSICAL THERAPIST

## 2020-02-25 NOTE — PROGRESS NOTES
Faith Garcia   PHYSICAL THERAPY - DAILY TREATMENT NOTE    Patient Name: Lui Kruse        Date: 2020  : 1985   yes Patient  Verified  Visit #:     Insurance: Payor: Heather Marx / Plan: [id number] HMO/CHOICE PLUS/POS / Product Type: HMO /      In time: 1275 Out time: 1130   Total Treatment Time: 60     Medicare/[facility] Time Tracking (below)   Total Timed Codes (min):  na 1:1 Treatment Time:  na     TREATMENT AREA =  Right knee pain [M25.561]    SUBJECTIVE  Pain Level (on 0 to 10 scale):  0  / 10   Medication Changes/New allergies or changes in medical history, any new surgeries or procedures?    no  If yes, update Summary List   Subjective Functional Status/Changes:  []  No changes reported     Went back to work yesterday for a 6 hour shift and I did not have any pain but it did swell towards the end of the day        OBJECTIVE    45 min Therapeutic Exercise:  [x]  See flow sheet   Rationale:      increase ROM, increase strength, improve coordination, improve balance and increase proprioception to improve the patients ability to complete adls     15 min Manual Therapy: Knee prom, pat sup/inf mobs, graston release hs and gastroc    Rationale:      decrease pain, increase ROM, increase tissue extensibility and decrease trigger points to improve patient's ability to complete adls       Billed With/As:   [] TE   [] TA   [] Neuro   [] Self Care Patient Education: [x] Review HEP    [] Progressed/Changed HEP based on:   [] positioning   [] body mechanics   [] transfers   [] heat/ice application    [] other:      Other Objective/Functional Measures:  Increased sherrie-patellar edema compared to previous session but no loss in rom and able to perform lateral step downs with good form and no pain    Post Treatment Pain Level (on 0 to 10) scale:   0  / 10     ASSESSMENT  Assessment/Changes in Function:   Pt returned to work this week and advised him to modify any lifting or squatting in order to avoid repetitive trauma to knee creating edema. []  See Progress Note/Recertification   Patient will continue to benefit from skilled PT services to modify and progress therapeutic interventions, address functional mobility deficits, address ROM deficits, address strength deficits, analyze and address soft tissue restrictions, analyze and cue movement patterns, analyze and modify body mechanics/ergonomics, assess and modify postural abnormalities, address imbalance/dizziness and instruct in home and community integration to attain remaining goals.    Progress toward goals / Updated goals:    Continues with steady gains towards all established goals      PLAN  []  Upgrade activities as tolerated yes Continue plan of care   []  Discharge due to :    []  Other:      Therapist: Francisca Hilario, PT    Date: 2/25/2020 Time: 12:15 PM     Future Appointments   Date Time Provider Mariela Devlin   2/25/2020 10:30 AM Merari Carrion, PT [facility]   2/27/2020  9:00 AM Merari Carrion, PT [facility]   3/3/2020 10:30 AM Merari Carrion, PT [facility]   3/5/2020 11:00 AM Versa Cage [facility]   3/9/2020 10:30 AM Merari Carrion, PT [facility]   3/11/2020 10:30 AM Muriel Germain, PT [facility]

## 2020-02-27 ENCOUNTER — HOSPITAL ENCOUNTER (OUTPATIENT)
Dept: PHYSICAL THERAPY | Age: 35
Discharge: HOME | End: 2020-02-27
Payer: COMMERCIAL

## 2020-02-27 PROCEDURE — 97140 MANUAL THERAPY 1/> REGIONS: CPT | Performed by: PHYSICAL THERAPIST

## 2020-02-27 PROCEDURE — 97110 THERAPEUTIC EXERCISES: CPT | Performed by: PHYSICAL THERAPIST

## 2020-02-27 NOTE — PROGRESS NOTES
Herbert Yoon   PHYSICAL THERAPY - DAILY TREATMENT NOTE    Patient Name: Aileen Wilkerson        Date: 2020  : 1985   yes Patient  Verified  Visit #:     Insurance: Payor: Lender Linder / Plan: [id number] HMO/CHOICE PLUS/POS / Product Type: HMO /      In time: 900 Out time: 955   Total Treatment Time: 55     Medicare/[employer] Time Tracking (below)   Total Timed Codes (min):  na 1:1 Treatment Time:  na     TREATMENT AREA =  Right knee pain [M25.561]    SUBJECTIVE  Pain Level (on 0 to 10 scale):  0  / 10   Medication Changes/New allergies or changes in medical history, any new surgeries or procedures?    no  If yes, update Summary List   Subjective Functional Status/Changes:  []  No changes reported     4th day at work and my leg feels tired but no pain or soreness        OBJECTIVE    40 min Therapeutic Exercise:  [x]  See flow sheet   Rationale:      increase ROM, increase strength, improve coordination, improve balance and increase proprioception to improve the patients ability to complete adls     15 min Manual Therapy: Knee prom, pat sup/inf mobs, graston release hs and gastroc, ant tib release   Rationale:      decrease pain, increase ROM, increase tissue extensibility and decrease trigger points to improve patient's ability to complete adls       Billed With/As:   [] TE   [] TA   [] Neuro   [] Self Care Patient Education: [x] Review HEP    [] Progressed/Changed HEP based on:   [] positioning   [] body mechanics   [] transfers   [] heat/ice application    [] other:      Other Objective/Functional Measures:  Reported increased anterior tib pain with ambulation - ttp along mid mm belly  Prom flex 121   te as per flow sheet with increased weight on RDL without c/o    Post Treatment Pain Level (on 0 to 10) scale:   0  / 10     ASSESSMENT  Assessment/Changes in Function:   Anterior tib tightness likely due to continued DF during gait - pt was advised on appropriate gait form      []  See Progress Note/Recertification   Patient will continue to benefit from skilled PT services to modify and progress therapeutic interventions, address functional mobility deficits, address ROM deficits, address strength deficits, analyze and address soft tissue restrictions, analyze and cue movement patterns, analyze and modify body mechanics/ergonomics, assess and modify postural abnormalities, address imbalance/dizziness and instruct in home and community integration to attain remaining goals.    Progress toward goals / Updated goals:    Continues with gains towards all established goals      PLAN  []  Upgrade activities as tolerated yes Continue plan of care   []  Discharge due to :    []  Other:      Therapist: Fior Nichols, PT    Date: 2/27/2020 Time: 12:15 PM     Future Appointments   Date Time Provider Mariela Devlin   2/27/2020  9:00 AM Teri Royal, PT [facility]   3/3/2020 10:30 AM Teri Royal, PT [facility]   3/5/2020 11:00 AM Violet Santos [facility]   3/9/2020 10:30 AM Teri Royal, PT [facility]   3/11/2020 10:30 AM Francois Germain, PT [facility]

## 2020-03-03 ENCOUNTER — HOSPITAL ENCOUNTER (OUTPATIENT)
Dept: PHYSICAL THERAPY | Age: 35
Discharge: HOME | End: 2020-03-03
Payer: COMMERCIAL

## 2020-03-03 PROCEDURE — 97140 MANUAL THERAPY 1/> REGIONS: CPT | Performed by: PHYSICAL THERAPIST

## 2020-03-03 NOTE — PROGRESS NOTES
[facility] PHYSICAL THERAPY - DAILY TREATMENT NOTE    Patient Name: Allen Machuca        Date: 3/3/2020  : 1985   yes Patient  Verified  Visit #:   15   of   16  Insurance: Payor: Sivakumar Padilla / Plan: [employer] HMO/CHOICE PLUS/POS / Product Type: HMO /      In time:  Out time:    Total Treatment Time: 35     Medicare/[facility] Time Tracking (below)   Total Timed Codes (min):  na 1:1 Treatment Time:  na     TREATMENT AREA =  Right knee pain [M25.561]    SUBJECTIVE  Pain Level (on 0 to 10 scale):  0  / 10   Medication Changes/New allergies or changes in medical history, any new surgeries or procedures?    no  If yes, update Summary List   Subjective Functional Status/Changes:  []  No changes reported       I think I pushed it too hard last week because by Saturday night after work my knee was swollen     OBJECTIVE    nc min Therapeutic Exercise:  [x]  See flow sheet   Rationale:      increase ROM, increase strength, improve coordination, improve balance and increase proprioception to improve the patients ability to complete adls     28 min Manual Therapy: Knee prom, pat sup/inf mobs, graston release hs and gastroc, ant tib release   Rationale:      decrease pain, increase ROM, increase tissue extensibility and decrease trigger points to improve patient's ability to complete adls       Billed With/As:   [] TE   [] TA   [] Neuro   [] Self Care Patient Education: [x] Review HEP    [] Progressed/Changed HEP based on:   [] positioning   [] body mechanics   [] transfers   [] heat/ice application    [] other:      Other Objective/Functional Measures:  Full tke and arom flex to 121 without pain, noted sherrie-patellar edema, pain and tenderness throughout pat lig, floating patella noted - able to perform tke during hurdles and squats to 45    Post Treatment Pain Level (on 0 to 10) scale:   0  / 10     ASSESSMENT  Assessment/Changes in Function:     Held on te due to subjective and objective findings. []  See Progress Note/Recertification   Patient will continue to benefit from skilled PT services to modify and progress therapeutic interventions, address functional mobility deficits, address ROM deficits, address strength deficits, analyze and address soft tissue restrictions, analyze and cue movement patterns, analyze and modify body mechanics/ergonomics, assess and modify postural abnormalities, address imbalance/dizziness and instruct in home and community integration to attain remaining goals.    Progress toward goals / Updated goals:   no changes towards goal since previous session      PLAN  []  Upgrade activities as tolerated yes Continue plan of care   []  Discharge due to :    []  Other:      Therapist: Padmini Howard PT    Date: 3/3/2020 Time: 12:15 PM     Future Appointments   Date Time Provider Mariela Devlin   3/3/2020 10:30 AM Karley Alexis, PT [facility]   3/5/2020 11:00 AM Karley Alexis, PT [facility]   3/9/2020 10:30 AM Kalrey Alexis, PT [facility]   3/11/2020 10:30 AM Gt Germain, PT [facility]

## 2020-03-05 ENCOUNTER — HOSPITAL ENCOUNTER (OUTPATIENT)
Dept: PHYSICAL THERAPY | Age: 35
Discharge: HOME | End: 2020-03-05
Payer: COMMERCIAL

## 2020-03-05 PROCEDURE — 97110 THERAPEUTIC EXERCISES: CPT | Performed by: PHYSICAL THERAPIST

## 2020-03-05 PROCEDURE — 97140 MANUAL THERAPY 1/> REGIONS: CPT | Performed by: PHYSICAL THERAPIST

## 2020-03-05 NOTE — PROGRESS NOTES
[facility]oltan Buenrostro PHYSICAL THERAPY - DAILY TREATMENT NOTE    Patient Name: Inessa Corbin        Date: 3/5/2020  : 1985   yes Patient  Verified  Visit #:   15   of   16  Insurance: Payor: Antonieta Rogel / Plan: [place] HMO/CHOICE PLUS/POS / Product Type: HMO /      In time: 1055 Out time: 1140   Total Treatment Time: 45     Medicare/[facility] Time Tracking (below)   Total Timed Codes (min):  na 1:1 Treatment Time:  na     TREATMENT AREA =  Right knee pain [M25.561]    SUBJECTIVE  Pain Level (on 0 to 10 scale):  0  / 10   Medication Changes/New allergies or changes in medical history, any new surgeries or procedures?    no  If yes, update Summary List   Subjective Functional Status/Changes:  []  No changes reported     Feels so mch better than it did the other day. Much less tightness     OBJECTIVE    30 min Therapeutic Exercise:  [x]  See flow sheet   Rationale:      increase ROM, increase strength, improve coordination, improve balance and increase proprioception to improve the patients ability to complete adls     15 min Manual Therapy: Knee prom, pat sup/inf mobs, graston release hs and gastroc   Rationale:      decrease pain, increase ROM, increase tissue extensibility and decrease trigger points to improve patient's ability to complete adls       Billed With/As:   [] TE   [] TA   [] Neuro   [] Self Care Patient Education: [x] Review HEP    [] Progressed/Changed HEP based on:   [] positioning   [] body mechanics   [] transfers   [] heat/ice application    [] other:      Other Objective/Functional Measures:  Reduction in infrapatellar knee pain and swelling.  Able to resume all te as per flow sheet with cues required for new te to ensure form - no medial collapse with lateral step downs    Post Treatment Pain Level (on 0 to 10) scale:   0  / 10     ASSESSMENT  Assessment/Changes in Function:     Good eccentric quad control with te    []  See Progress Note/Recertification   Patient will continue to benefit from skilled PT services to modify and progress therapeutic interventions, address functional mobility deficits, address ROM deficits, address strength deficits, analyze and address soft tissue restrictions, analyze and cue movement patterns, analyze and modify body mechanics/ergonomics, assess and modify postural abnormalities, address imbalance/dizziness and instruct in home and community integration to attain remaining goals.    Progress toward goals / Updated goals:  Progress towards strength goals, 1 point progress with pain      PLAN  []  Upgrade activities as tolerated yes Continue plan of care   []  Discharge due to :    []  Other:      Therapist: Mark Houser PT    Date: 3/5/2020 Time: 12:15 PM     Future Appointments   Date Time Provider Mariela Devlin   3/9/2020 10:30 AM Tay Fermin, PT [facility]   3/11/2020 10:30 AM Margaret Germain, PT [facility]

## 2020-03-09 ENCOUNTER — APPOINTMENT (OUTPATIENT)
Dept: PHYSICAL THERAPY | Age: 35
End: 2020-03-09
Payer: COMMERCIAL

## 2020-03-11 ENCOUNTER — HOSPITAL ENCOUNTER (OUTPATIENT)
Dept: PHYSICAL THERAPY | Age: 35
Discharge: HOME | End: 2020-03-11
Payer: COMMERCIAL

## 2020-03-11 PROCEDURE — 97140 MANUAL THERAPY 1/> REGIONS: CPT | Performed by: PHYSICAL THERAPIST

## 2020-03-11 PROCEDURE — 97110 THERAPEUTIC EXERCISES: CPT | Performed by: PHYSICAL THERAPIST

## 2020-03-11 NOTE — PROGRESS NOTES
Faith Garcia   PHYSICAL THERAPY - DAILY TREATMENT NOTE    Patient Name: Lui Kruse        Date: 3/11/2020  : 1985   yes Patient  Verified  Visit #:     Insurance: Payor: Heather Marx / Plan: [employer] HMO/CHOICE PLUS/POS / Product Type: HMO /      In time: 1740 Out time: 1365   Total Treatment Time: 53     Medicare/[facility] Time Tracking (below)   Total Timed Codes (min):  na 1:1 Treatment Time:  na     TREATMENT AREA =  Right knee pain [M25.561]    SUBJECTIVE  Pain Level (on 0 to 10 scale):  0  / 10   Medication Changes/New allergies or changes in medical history, any new surgeries or procedures?    no  If yes, update Summary List   Subjective Functional Status/Changes:  []  No changes reported     See pn      OBJECTIVE    40 min Therapeutic Exercise:  [x]  See flow sheet   Rationale:      increase ROM, increase strength, improve coordination, improve balance and increase proprioception to improve the patients ability to complete adls     13 min Manual Therapy: Knee prom, pat sup/inf mobs, graston release hs and gastroc   Rationale:      decrease pain, increase ROM, increase tissue extensibility and decrease trigger points to improve patient's ability to complete adls       Billed With/As:   [] TE   [] TA   [] Neuro   [] Self Care Patient Education: [x] Review HEP    [] Progressed/Changed HEP based on:   [] positioning   [] body mechanics   [] transfers   [] heat/ice application    [] other:      Other Objective/Functional Measures:  Progressed te as per flow sheet   See pn    Post Treatment Pain Level (on 0 to 10) scale:   0  / 10     ASSESSMENT  Assessment/Changes in Function:     See pn     []  See Progress Note/Recertification   Patient will continue to benefit from skilled PT services to modify and progress therapeutic interventions, address functional mobility deficits, address ROM deficits, address strength deficits, analyze and address soft tissue restrictions, analyze and cue movement patterns, analyze and modify body mechanics/ergonomics, assess and modify postural abnormalities, address imbalance/dizziness and instruct in home and community integration to attain remaining goals.    Progress toward goals / Updated goals:  See pn      PLAN  []  Upgrade activities as tolerated yes Continue plan of care   []  Discharge due to :    []  Other:      Therapist: Dahlia Navarrete PT    Date: 3/11/2020 Time: 12:15 PM     Future Appointments   Date Time Provider Mariela Devlin   3/16/2020  9:00 AM Storey Murders, PT [place] [address]   3/18/2020  9:00 AM Storey Murders, PT [place] [address]   3/30/2020  9:00 AM Storey Murders, PT [place] [address]   4/1/2020  9:00 AM Storey Murders, PT [place] [address]   4/8/2020  9:00 AM Storey Murders, PT [place] [address]   4/10/2020  9:00 AM Storey Murders, PT [place] [address]   4/13/2020  9:00 AM Storey Murders, PT [place] [address]   4/15/2020  9:00 AM Storey Murders, PT [place] [address]   4/20/2020  9:00 AM Blessing Germain, PT [place] [address]

## 2020-03-11 NOTE — PROGRESS NOTES
.[facility] PHYSICAL THERAPY  [address] [place] [address], [facility] [address],[facility], [address] [facility] - Phone: [phone]  Fax: [phone]  PROGRESS NOTE  Patient Name: Mayito Laureano : 1985   Treatment/Medical Diagnosis: Right knee pain [M25.561]   Referral Source: Radha Redmond*     Date of Initial Visit: 20 Attended Visits: 15 Missed Visits: 0     SUMMARY OF TREATMENT  Pt seen for IE and 14 f/u visits with treatment consisting of therapeutic exercises for knee rom/LE strengthening, manual therapy (prom/mobs/stm), gait/balance training and modalities prn. In addition pt was instructed on hep/activity modification. CURRENT STATUS  Pt has made good progress with PT thus far at approximately two months post op. He has restored full knee arom and rates pain 1/10 max when attempting to kneel otherwise baseline 0/10. At this time program is focusing on strengthening phase of protocol to improve eccentric loading of quads and glutes. Would like to attempt PT in order to address LE weakness. Goal/Measure of Progress Goal Met? 1. Pt will increase knee flexion >/=110 to A with mobility    Status at last Eval: 0-106 Current Status: 0--126 yes   2. Pt will be able to perform 2x10-15 reps four inch lateral step downs in order to improve eccentric quad strength    Status at last Eval: unable Current Status: 6\" yes   3. Pt will ambulate community distances with symmetrical gait pattern    Status at last Eval: Flexed knee after 10 minutes ambulation  Current Status: Symmetrical unlimited amount of time  yes     New Goals to be achieved in __4__  weeks:  1. Pt will be able to squat to parallel without medial collapse in order to perform work related activities  2. Pt will increase FOTO an additional 7 points in order to show functional improvement  3.  Pt will be able to lift/pull >/50# from floor to waist to complete work related activities   RECOMMENDATIONS  Continue therapy an additional 2x/4 weeks to progressing into next phase of protocol   If you have any questions/comments please contact us directly at [phone]. Thank you for allowing us to assist in the care of your patient. Therapist Signature: Flores Peterson PT Date: 3/11/2020     Time: 12:49 PM   NOTE TO PHYSICIAN:  PLEASE COMPLETE THE ORDERS BELOW AND FAX TO   [facility] Physical Therapy: [phone]  If you are unable to process this request in 24 hours please contact our office: [phone]    ___ I have read the above report and request that my patient continue as recommended.   ___ I have read the above report and request that my patient continue therapy with the following changes/special instructions:_________________________________________________________   ___ I have read the above report and request that my patient be discharged from therapy.      Physician Signature:        Date:       Time:

## 2020-03-16 ENCOUNTER — APPOINTMENT (OUTPATIENT)
Dept: PHYSICAL THERAPY | Age: 35
End: 2020-03-16
Payer: COMMERCIAL

## 2020-03-18 ENCOUNTER — HOSPITAL ENCOUNTER (OUTPATIENT)
Dept: PHYSICAL THERAPY | Age: 35
Discharge: HOME | End: 2020-03-18
Payer: COMMERCIAL

## 2020-03-18 PROCEDURE — 97140 MANUAL THERAPY 1/> REGIONS: CPT | Performed by: PHYSICAL THERAPIST

## 2020-03-18 PROCEDURE — 97110 THERAPEUTIC EXERCISES: CPT | Performed by: PHYSICAL THERAPIST

## 2020-03-18 NOTE — PROGRESS NOTES
[facility] PHYSICAL THERAPY - DAILY TREATMENT NOTE    Patient Name: Debbie Ruiz        Date: 3/18/2020  : 1985   yes Patient  Verified  Visit #:   [address]   of   24  Insurance: Payor: Clarice Score / Plan: [address] HMO/CHOICE PLUS/POS / Product Type: HMO /      In time: 630 Out time: 1010   Total Treatment Time: 60     Medicare/[employer] Time Tracking (below)   Total Timed Codes (min):  na 1:1 Treatment Time:  na     TREATMENT AREA =  Right knee pain [M25.561]    SUBJECTIVE  Pain Level (on 0 to 10 scale):  0  / 10   Medication Changes/New allergies or changes in medical history, any new surgeries or procedures?    no  If yes, update Summary List   Subjective Functional Status/Changes:  []  No changes reported   With all this virus stuff going on I have not really been doing any exercises        OBJECTIVE  Modalities Rationale:     decrease pain and increase tissue extensibility to improve patient's ability to compliant with hep                min []? Estim, type/location:                                                            []?  att     []?  unatt     []?  w/US     []?  w/ice    []?  w/heat     min []? Mechanical Traction: type/lbs                    []?  pro   []?  sup   []? int   []?  cont    []? before manual    []? after manual     min []? Ultrasound, settings/location:        min []? Iontophoresis w/ dexamethasone, location:                                                []?  take home patch       []? in clinic   10 min [x]? Ice     []? Heat    location/position: Long sit     min []? Vasopneumatic Device, press/temp:       min []? Other:     [x]? Skin assessment post-treatment (if applicable):    [x]? intact    []? redness- no adverse reaction     []? redness  adverse reaction:          40 min Therapeutic Exercise:  [x]  See flow sheet   Rationale:      increase ROM, increase strength, improve coordination, improve balance and increase proprioception to improve the patients ability to complete adls     10 min Manual Therapy: Knee prom, pat sup/inf mobs, graston release hs and gastroc   Rationale:      decrease pain, increase ROM, increase tissue extensibility and decrease trigger points to improve patient's ability to complete adls       Billed With/As:   [] TE   [] TA   [] Neuro   [] Self Care Patient Education: [x] Review HEP    [] Progressed/Changed HEP based on:   [] positioning   [] body mechanics   [] transfers   [] heat/ice application    [] other:      Other Objective/Functional Measures:  Attempted te as per flow sheet with noted pain during trx lunges along joint line with noted medial instability    Post Treatment Pain Level (on 0 to 10) scale:   0  / 10     ASSESSMENT  Assessment/Changes in Function:     No increase in pain following session    []  See Progress Note/Recertification   Patient will continue to benefit from skilled PT services to modify and progress therapeutic interventions, address functional mobility deficits, address ROM deficits, address strength deficits, analyze and address soft tissue restrictions, analyze and cue movement patterns, analyze and modify body mechanics/ergonomics, assess and modify postural abnormalities, address imbalance/dizziness and instruct in home and community integration to attain remaining goals.    Progress toward goals / Updated goals:  Progress towards goal      PLAN  []  Upgrade activities as tolerated yes Continue plan of care   []  Discharge due to :    []  Other:      Therapist: Shamika Bedoya PT    Date: 3/18/2020 Time: 12:15 PM     Future Appointments   Date Time Provider Mariela Devlin   3/18/2020  9:00 AM Regenia Dine, PT [facility]   3/30/2020  9:00 AM Regenia Dine, PT [facility]   4/1/2020  9:00 AM Regenia Dine, PT [facility]   4/8/2020  9:00 AM Regenia Dine, PT [facility]   4/10/2020  9:00 AM Regenia Dine, PT [facility]   4/13/2020  9:00 AM Regenia Dine, PT [facility]   4/15/2020  9:00 AM Cyndi Arora [facility]   4/20/2020  9:00 AM Maria L Germain, PT [facility]

## 2020-03-30 ENCOUNTER — HOSPITAL ENCOUNTER (OUTPATIENT)
Dept: PHYSICAL THERAPY | Age: 35
Discharge: HOME | End: 2020-03-30
Payer: COMMERCIAL

## 2020-03-30 PROCEDURE — 97110 THERAPEUTIC EXERCISES: CPT | Performed by: PHYSICAL THERAPIST

## 2020-03-30 PROCEDURE — 97140 MANUAL THERAPY 1/> REGIONS: CPT | Performed by: PHYSICAL THERAPIST

## 2020-03-30 NOTE — PROGRESS NOTES
[facility] PHYSICAL THERAPY - DAILY TREATMENT NOTE    Patient Name: Julisa Moya        Date: 3/30/2020  : 1985   yes Patient  Verified  Visit #:     Insurance: Payor: Marlyn Payton / Plan: [employer] HMO/CHOICE PLUS/POS / Product Type: HMO /      In time: 900 Out time: 1000   Total Treatment Time: 60     Medicare/[facility] Time Tracking (below)   Total Timed Codes (min):  na 1:1 Treatment Time:  na     TREATMENT AREA =  Right knee pain [M25.561]    SUBJECTIVE  Pain Level (on 0 to 10 scale):  0  / 10   Medication Changes/New allergies or changes in medical history, any new surgeries or procedures?    no  If yes, update Summary List   Subjective Functional Status/Changes:  []  No changes reported     I have not had  Any issues since the last time I was in but I have not done anything cause of the whole virus thing.       OBJECTIVE      50 min Therapeutic Exercise:  [x]  See flow sheet   Rationale:      increase ROM, increase strength, improve coordination, improve balance and increase proprioception to improve the patients ability to complete adls     10 min Manual Therapy: Knee prom, pat sup/inf mobs, graston release hs and gastroc   Rationale:      decrease pain, increase ROM, increase tissue extensibility and decrease trigger points to improve patient's ability to complete adls       Billed With/As:   [] TE   [] TA   [] Neuro   [] Self Care Patient Education: [x] Review HEP    [] Progressed/Changed HEP based on:   [] positioning   [] body mechanics   [] transfers   [] heat/ice application    [] other:      Other Objective/Functional Measures:  Arom 131 without c/o pain but noted quad tightness  te as per flow sheet with visual cues required to avoid medial collapse    Post Treatment Pain Level (on 0 to 10) scale:   0  / 10     ASSESSMENT  Assessment/Changes in Function:   No increase in pain following session      []  See Progress Note/Recertification   Patient will continue to benefit from skilled PT services to modify and progress therapeutic interventions, address functional mobility deficits, address ROM deficits, address strength deficits, analyze and address soft tissue restrictions, analyze and cue movement patterns, analyze and modify body mechanics/ergonomics, assess and modify postural abnormalities, address imbalance/dizziness and instruct in home and community integration to attain remaining goals.    Progress toward goals / Updated goals:  Due to COVID-19 pt has not had access to a gym and therefore unable to perform any weighted hep and therefore no progress towards goal      PLAN  []  Upgrade activities as tolerated yes Continue plan of care   []  Discharge due to :    []  Other:      Therapist: Fanny Barnes, PT    Date: 3/30/2020 Time: 12:15 PM     Future Appointments   Date Time Provider Mariela Devlin   4/1/2020  3:00 PM [facility]   4/8/2020  4:00 PM [facility]   4/13/2020  9:00 AM Logan Lung, PT [facility]   4/15/2020  3:00 PM Logan Lung, PT [facility]   4/20/2020  9:00 AM Logan Lung, PT [facility]

## 2020-04-01 ENCOUNTER — HOSPITAL ENCOUNTER (OUTPATIENT)
Dept: PHYSICAL THERAPY | Age: 35
Discharge: HOME | End: 2020-04-01
Payer: COMMERCIAL

## 2020-04-01 PROCEDURE — 97140 MANUAL THERAPY 1/> REGIONS: CPT | Performed by: PHYSICAL THERAPIST

## 2020-04-01 PROCEDURE — 97110 THERAPEUTIC EXERCISES: CPT | Performed by: PHYSICAL THERAPIST

## 2020-04-01 NOTE — PROGRESS NOTES
[facility] PHYSICAL THERAPY - DAILY TREATMENT NOTE    Patient Name: Garland Jack        Date: 2020  : 1985   yes Patient  Verified  Visit #:     Insurance: Payor: Cathy Noel / Plan: [employer] HMO/CHOICE PLUS/POS / Product Type: HMO /      In time: 258 Out time: 346   Total Treatment Time: 46     Medicare/[facility] Time Tracking (below)   Total Timed Codes (min):  na 1:1 Treatment Time:  na     TREATMENT AREA =  Right knee pain [M25.561]    SUBJECTIVE  Pain Level (on 0 to 10 scale):  0  / 10   Medication Changes/New allergies or changes in medical history, any new surgeries or procedures?    no  If yes, update Summary List   Subjective Functional Status/Changes:  []  No changes reported     I have not had  Any issues since the last time I was in but I have not done anything cause of the whole virus thing.       OBJECTIVE      36 min Therapeutic Exercise:  [x]  See flow sheet   Rationale:      increase ROM, increase strength, improve coordination, improve balance and increase proprioception to improve the patients ability to complete adls     10 min Manual Therapy: Knee prom, pat sup/inf mobs, graston release hs and gastroc   Rationale:      decrease pain, increase ROM, increase tissue extensibility and decrease trigger points to improve patient's ability to complete adls       Billed With/As:   [] TE   [] TA   [] Neuro   [] Self Care Patient Education: [x] Review HEP    [] Progressed/Changed HEP based on:   [] positioning   [] body mechanics   [] transfers   [] heat/ice application    [] other:      Other Objective/Functional Measures:  Progressed posterior chain te as per flow sheet - able to perform elevated stiff legged rdl without pain/compensation and full depth range  Challenged with sb hs curl due to weakness   Post Treatment Pain Level (on 0 to 10) scale:   0  / 10     ASSESSMENT  Assessment/Changes in Function:   No increase in pain following session      []  See Progress Note/Recertification   Patient will continue to benefit from skilled PT services to modify and progress therapeutic interventions, address functional mobility deficits, address ROM deficits, address strength deficits, analyze and address soft tissue restrictions, analyze and cue movement patterns, analyze and modify body mechanics/ergonomics, assess and modify postural abnormalities, address imbalance/dizziness and instruct in home and community integration to attain remaining goals.    Progress toward goals / Updated goals:  Pt to continue additoinal month with strength phase progressing 1x push/1xpull      PLAN  []  Upgrade activities as tolerated yes Continue plan of care   []  Discharge due to :    []  Other:      Therapist: Allan Bhatt PT    Date: 4/1/2020 Time: 12:15 PM     Future Appointments   Date Time Provider Mariela Devlin   4/8/2020  4:00 PM [facility]   4/13/2020  9:00 AM [facility]vonne Vinson, PT [facility]   4/15/2020  3:00 PM [facility]ne [facility]   4/20/2020  9:00 AM Joseluis Germain, PT [facility]

## 2020-04-08 ENCOUNTER — HOSPITAL ENCOUNTER (OUTPATIENT)
Dept: PHYSICAL THERAPY | Age: 35
Discharge: HOME | End: 2020-04-08
Payer: COMMERCIAL

## 2020-04-08 PROCEDURE — 97110 THERAPEUTIC EXERCISES: CPT | Performed by: PHYSICAL THERAPIST

## 2020-04-08 NOTE — PROGRESS NOTES
[facility] PHYSICAL THERAPY - DAILY TREATMENT NOTE    Patient Name: Lynette Germain        Date: 2020  : 1985   yes Patient  Verified  Visit #:     Insurance: Payor: [employer] / Plan: [employer] HMO/CHOICE PLUS/POS / Product Type: HMO /      In time: 300 Out time: 337   Total Treatment Time: 35     Medicare/[employer] Time Tracking (below)   Total Timed Codes (min):  na 1:1 Treatment Time:  na     TREATMENT AREA =  Right knee pain [M25.561]    SUBJECTIVE  Pain Level (on 0 to 10 scale):  0  / 10   Medication Changes/New allergies or changes in medical history, any new surgeries or procedures?    no  If yes, update Summary List   Subjective Functional Status/Changes:  []  No changes reported   I had my virtual visit with the PA and she said that everything looked great and to continue with therapy/protocol with whatever you say      OBJECTIVE      35 min Therapeutic Exercise:  [x]  See flow sheet   Rationale:      increase ROM, increase strength, improve coordination, improve balance and increase proprioception to improve the patients ability to complete adls     Billed With/As:   [] TE   [] TA   [] Neuro   [] Self Care Patient Education: [x] Review HEP    [] Progressed/Changed HEP based on:   [] positioning   [] body mechanics   [] transfers   [] heat/ice application    [] other:      Other Objective/Functional Measures:  Initiated te with dynamic warm up and anterior chain/push strength exercises as per flow sheet, reported infrapatellar discomfort during L forward lunge otherwise unremarkable  Remaining session was held due to therapist having medical emergency - will resume prn    Post Treatment Pain Level (on 0 to 10) scale:   0  / 10     ASSESSMENT  Assessment/Changes in Function:   Continues to have patellar tendon discomfort with any attempts to body weight squat >90 degrees that resolves immediately without continues sx. Will modify interventions accordingly.       []  See Progress Note/Recertification   Patient will continue to benefit from skilled PT services to modify and progress therapeutic interventions, address functional mobility deficits, address ROM deficits, address strength deficits, analyze and address soft tissue restrictions, analyze and cue movement patterns, analyze and modify body mechanics/ergonomics, assess and modify postural abnormalities, address imbalance/dizziness and instruct in home and community integration to attain remaining goals. Progress toward goals / Updated goals: Will perform FOTO next session in order to assess progress towards LTG  Continues to have difficulty with self progression and/or modifications to hep.  Requires therapist intervention >90% of time     PLAN  []  Upgrade activities as tolerated yes Continue plan of care   []  Discharge due to :    []  Other:      Therapist: Ann Mcmahon PT    Date: 4/8/2020 Time: 12:15 PM     Future Appointments   Date Time Provider Mariela Devlin   4/8/2020  4:00 PM Audi Ojeda [facility]   4/13/2020  9:00 AM Michael Willson, PT [facility]   4/15/2020  3:00 PM Michael Willson, PT [facility]   4/20/2020  9:00 AM Gerardo Germain Officer, PT [facility]

## 2020-04-10 ENCOUNTER — APPOINTMENT (OUTPATIENT)
Dept: PHYSICAL THERAPY | Age: 35
End: 2020-04-10
Payer: COMMERCIAL

## 2020-04-13 ENCOUNTER — APPOINTMENT (OUTPATIENT)
Dept: PHYSICAL THERAPY | Age: 35
End: 2020-04-13
Payer: COMMERCIAL

## 2020-04-15 ENCOUNTER — HOSPITAL ENCOUNTER (OUTPATIENT)
Dept: PHYSICAL THERAPY | Age: 35
Discharge: HOME | End: 2020-04-15
Payer: COMMERCIAL

## 2020-04-15 PROCEDURE — 97140 MANUAL THERAPY 1/> REGIONS: CPT | Performed by: PHYSICAL THERAPIST

## 2020-04-15 NOTE — PROGRESS NOTES
.[facility]  PHYSICAL THERAPY  [address] [place] [address] 201,[facility], [facility] - Phone: [phone]  Fax: [phone]  PROGRESS NOTE  Patient Name: Jessie Sheriff : 1985   Treatment/Medical Diagnosis: Right knee pain [M25.561]   Referral Source: Amanda Loera*     Date of Initial Visit: 20 Attended Visits: 20 Missed Visits: 0     SUMMARY OF TREATMENT  Pt seen for IE and 19 f/u visits with treatment consisting of therapeutic exercises for knee rom/LE strengthening, manual therapy (prom/mobs/stm), gait/balance training and modalities prn. In addition pt was instructed on hep/activity modification. CURRENT STATUS  Pt schedule has been significantly limited during covid-. He has participated in strengthening phase as able to with therapy. He has full knee arom 0-129 pain free and is able to squat  Below parallel and eccentric load on 8\" step. At this time would like to progress to jumping and linear running when able    Goal/Measure of Progress Goal Met? 1. Pt will be able to squat to parallel without medial collapse in order to perform work related activities   Status at last Eval: 65 Current Status:  yes   2. Pt will increase FOTO an additional 7 points in order to show functional improvement   Status at last Eval: 58 Current Status: Pt did not fill out na   3. Pt will be able to lift/pull >/50# from floor to waist to complete work related activities    Status at last Eval: Body weught Current Status: 95 yes     New Goals to be achieved in __4__  weeks:  1.achieve goal #2  2. Pt will be bale ot perform 2x-8-10 reps of box jumps to return to running  3. Pt will be able to perform 12\"> step ups/downs without valgus collapse to complete work related duties  RECOMMENDATIONS  Continue therapy an additional 2x/4 weeks to progressing into next phase of protocol   If you have any questions/comments please contact us directly at [phone].    Thank you for allowing us to assist in the care of your patient. Therapist Signature: Claritza Nicholas PT Date: 4/15/2020     Time: 12:49 PM   NOTE TO PHYSICIAN:  PLEASE COMPLETE THE ORDERS BELOW AND FAX TO   [facility] [place] Physical Therapy: [phone]  If you are unable to process this request in 24 hours please contact our office: [phone]    ___ I have read the above report and request that my patient continue as recommended.   ___ I have read the above report and request that my patient continue therapy with the following changes/special instructions:_________________________________________________________   ___ I have read the above report and request that my patient be discharged from therapy.      Physician Signature:        Date:       Time:

## 2020-04-15 NOTE — PROGRESS NOTES
[facility] PHYSICAL THERAPY - DAILY TREATMENT NOTE    Patient Name: Sea Vargas        Date: 4/15/2020  : 1985   yes Patient  Verified  Visit #:     Insurance: Payor: Soraida Challenger / Plan: [employer] HMO/CHOICE PLUS/POS / Product Type: HMO /      In time: 405 Out time: 438   Total Treatment Time: 35     Medicare/[employer] Time Tracking (below)   Total Timed Codes (min):  na 1:1 Treatment Time:  na     TREATMENT AREA =  Right knee pain [M25.561]    SUBJECTIVE  Pain Level (on 0 to 10 scale):  0  / 10   Medication Changes/New allergies or changes in medical history, any new surgeries or procedures?    no  If yes, update Summary List   Subjective Functional Status/Changes:  []  No changes reported   See pn      OBJECTIVE      35 min Therapeutic Exercise:  [x]  See flow sheet   Rationale:      increase ROM, increase strength, improve coordination, improve balance and increase proprioception to improve the patients ability to complete adls     Billed With/As:   [] TE   [] TA   [] Neuro   [] Self Care Patient Education: [x] Review HEP    [] Progressed/Changed HEP based on:   [] positioning   [] body mechanics   [] transfers   [] heat/ice application    [] other:      Other Objective/Functional Measures:  See pn   Post Treatment Pain Level (on 0 to 10) scale:   0  / 10     ASSESSMENT  Assessment/Changes in Function:   See pn     []  See Progress Note/Recertification   Patient will continue to benefit from skilled PT services to modify and progress therapeutic interventions, address functional mobility deficits, address ROM deficits, address strength deficits, analyze and address soft tissue restrictions, analyze and cue movement patterns, analyze and modify body mechanics/ergonomics, assess and modify postural abnormalities, address imbalance/dizziness and instruct in home and community integration to attain remaining goals.    Progress toward goals / Updated goals:  See pn     PLAN  []  Upgrade activities as tolerated yes Continue plan of care   []  Discharge due to :    []  Other:      Therapist: Vivien Jenkins, PT    Date: 4/15/2020 Time: 12:15 PM     Future Appointments   Date Time Provider Mariela Devlin   4/15/2020  4:00 PM Luciana Gallardo [facility]   4/20/2020  9:00 AM Shelby [facility]ton [facility]

## 2020-04-20 ENCOUNTER — HOSPITAL ENCOUNTER (OUTPATIENT)
Dept: PHYSICAL THERAPY | Age: 35
End: 2020-04-20
Payer: COMMERCIAL

## 2020-04-22 ENCOUNTER — APPOINTMENT (OUTPATIENT)
Dept: PHYSICAL THERAPY | Age: 35
End: 2020-04-22
Payer: COMMERCIAL

## 2020-04-28 ENCOUNTER — APPOINTMENT (OUTPATIENT)
Dept: PHYSICAL THERAPY | Age: 35
End: 2020-04-28
Payer: COMMERCIAL

## 2020-04-30 ENCOUNTER — HOSPITAL ENCOUNTER (OUTPATIENT)
Dept: PHYSICAL THERAPY | Age: 35
Discharge: HOME | End: 2020-04-30
Payer: COMMERCIAL

## 2020-04-30 PROCEDURE — 97110 THERAPEUTIC EXERCISES: CPT

## 2020-04-30 PROCEDURE — 97112 NEUROMUSCULAR REEDUCATION: CPT

## 2020-04-30 NOTE — PROGRESS NOTES
PHYSICAL THERAPY - DAILY TREATMENT NOTE    Patient Name: Lynette Germain        Date: 2020  : 1985   yes Patient  Verified  Visit #:   21   of   24(+8)  Insurance: Payor: [employer] / Plan: [employer] HMO/CHOICE PLUS/POS / Product Type: HMO /      In time: 7867 Out time: 9243   Total Treatment Time: 39     Medicare/[employer] Time Tracking (below)   Total Timed Codes (min):  na 1:1 Treatment Time:  na     TREATMENT AREA =  Right knee pain [M25.561]    SUBJECTIVE  Pain Level (on 0 to 10 scale):  0  / 10   Medication Changes/New allergies or changes in medical history, any new surgeries or procedures?    no  If yes, update Summary List   Subjective Functional Status/Changes:  []  No changes reported     Patient reports he has been working on his strength as home as much as he can without complication. Patient denies any pain or discomfort since his LV. OBJECTIVE    29 min Therapeutic Exercise:  [x]  See flow sheet   Rationale:      increase strength, improve coordination, improve balance and increase proprioception to improve the patients ability to perform recreational activities. 10 min Neuromuscular Re-ed: [x]  See flow sheet   Rationale:    improve coordination, improve balance and increase proprioception to improve the patients ability to perform work activities.        Billed With/As:   [x] TE   [] TA   [] Neuro   [] Self Care Patient Education: [x] Review HEP    [] Progressed/Changed HEP based on:   [] positioning   [] body mechanics   [] transfers   [] heat/ice application    [] other:      Other Objective/Functional Measures:    Plyometrics as follows: DL drop step from 12 inches 10 reps, DL to DL box jump to 12 inches 10 reps, SL drop step from 6 inches 2x5  Patient cued during plyometrics on proper landing mechanics and absorption of forces, patient able to demonstrate proper technique by the end of the session  Added [employer] split squats with bodyweight this session for improved strength     Post Treatment Pain Level (on 0 to 10) scale:   0  / 10     ASSESSMENT  Assessment/Changes in Function:     Patient noted increased fatigue post session, however denied pain. Patient appropriate for gradual progression of plyometric and strengthening program in order to return to his PLOF. []  See Progress Note/Recertification   Patient will continue to benefit from skilled PT services to modify and progress therapeutic interventions, address functional mobility deficits, address ROM deficits, address strength deficits, analyze and address soft tissue restrictions, analyze and cue movement patterns, analyze and modify body mechanics/ergonomics and assess and modify postural abnormalities to attain remaining goals. Progress toward goals / Updated goals:    Progressing with new LTG#2     PLAN  [x]  Upgrade activities as tolerated yes Continue plan of care   []  Discharge due to :    []  Other:      Therapist: Darby Seo PT    Date: 4/30/2020 Time: 12:48 PM     No future appointments.

## 2020-05-11 ENCOUNTER — HOSPITAL ENCOUNTER (OUTPATIENT)
Dept: PHYSICAL THERAPY | Age: 35
Discharge: HOME | End: 2020-05-11
Payer: COMMERCIAL

## 2020-05-11 PROCEDURE — 97140 MANUAL THERAPY 1/> REGIONS: CPT | Performed by: PHYSICAL THERAPIST

## 2020-05-11 PROCEDURE — 97110 THERAPEUTIC EXERCISES: CPT | Performed by: PHYSICAL THERAPIST

## 2020-05-11 NOTE — PROGRESS NOTES
.[facility]  PHYSICAL THERAPY  [address] [place] [address] 201,[facility], [facility] - Phone: [phone]  Fax: [phone]  PROGRESS NOTE  Patient Name: Alyce Grande : 1985   Treatment/Medical Diagnosis: Right knee pain [M25.561]   Referral Source: Jericho Orourke*     Date of Initial Visit: 20 Attended Visits: 22 Missed Visits: See below      SUMMARY OF TREATMENT  Pt seen for IE and 21 f/u visits with treatment consisting of therapeutic exercises for knee rom/LE strengthening, manual therapy (prom/mobs/stm), gait/balance training and modalities prn. In addition pt was instructed on hep/activity modification. CURRENT STATUS  Due to COVID-19 pt was able to attend a total of 2 visits over the last month. He is currently 4 months post op. He has only been able to perform body weight supported te that time time. He has returned this week with progression to jumping and continued strengthening. He is able to perform single leg squats to 75 degree depth before onset of medial collapse. Reduced glute med and eccentric quad strength likely [facility]    Goal/Measure of Progress Goal Met? 1. Pt will be bale ot perform 2x-8-10 reps of box jumps to return to running   Status at last Eval: na Current Status: 3x10 yes   2. Pt will increase FOTO an additional 7 points in order to show functional improvement   Status at last Eval: 58 Current Status: 66 yes   3. Pt will be able to perform 12\"> step ups/downs without valgus collapse to complete work related duties   Status at last Eval: 6\" Current Status: 12 yes     New Goals to be achieved in __4__  weeks:  1. Pt will be able to accelerate up to 80% max speed without compensation in order to return to sport   2. Pt will be jaci to decelerate up to 60% max speed without compensation in order to return to sport   3.  Pt will be be cuate to perform single leg squats with equal depth to non-surgical side to return to plof RECOMMENDATIONS  Continue therapy an additional 1-2x/4 weeks progressing with dynamic drills   If you have any questions/comments please contact us directly at [phone]. Thank you for allowing us to assist in the care of your patient. Therapist Signature: Olivia Avina DPT, [facility]  Date: 5/11/2020     Time: 12:49 PM   NOTE TO PHYSICIAN:  PLEASE COMPLETE THE ORDERS BELOW AND FAX TO   [facility] Physical Therapy: [phone]  If you are unable to process this request in 24 hours please contact our office: [phone]    ___ I have read the above report and request that my patient continue as recommended.   ___ I have read the above report and request that my patient continue therapy with the following changes/special instructions:_________________________________________________________   ___ I have read the above report and request that my patient be discharged from therapy.      Physician Signature:        Date:       Time:

## 2020-05-13 ENCOUNTER — HOSPITAL ENCOUNTER (OUTPATIENT)
Dept: PHYSICAL THERAPY | Age: 35
End: 2020-05-13
Payer: COMMERCIAL

## 2020-05-18 ENCOUNTER — HOSPITAL ENCOUNTER (OUTPATIENT)
Dept: PHYSICAL THERAPY | Age: 35
Discharge: HOME | End: 2020-05-18
Payer: COMMERCIAL

## 2020-05-18 PROCEDURE — 97110 THERAPEUTIC EXERCISES: CPT | Performed by: PHYSICAL THERAPIST

## 2020-05-18 NOTE — PROGRESS NOTES
[facility]   PHYSICAL THERAPY - DAILY TREATMENT NOTE    Patient Name: Zoie Cho        Date: 2020  : 1985   yes Patient  Verified  Visit #:     Insurance: Payor: Oliverio Chaudhari / Plan: [employer] HMO/CHOICE PLUS/POS / Product Type: HMO /      In time:  Out time: 1250   Total Treatment Time: 25     Medicare/[facility] Time Tracking (below)   Total Timed Codes (min):  na 1:1 Treatment Time:  na     TREATMENT AREA =  Right knee pain [M25.561]    SUBJECTIVE  Pain Level (on 0 to 10 scale):  0  / 10   Medication Changes/New allergies or changes in medical history, any new surgeries or procedures?    no  If yes, update Summary List   Subjective Functional Status/Changes:  []  No changes reported   I had to apin my tires so I did about 2 hours of bending and twisting on my knee and I was sore for the first time since start of therapy            OBJECTIVE      25 min Therapeutic Exercise:  [x]  See flow sheet   Rationale:      increase ROM, increase strength, improve coordination, improve balance and increase proprioception to improve the patients ability to return to run     nc min Manual Therapy: Dtm/stretch quad/hs   Rationale:      decrease pain, increase ROM, increase tissue extensibility and decrease trigger points to improve patient's ability to return to run        Billed With/As:   [] TE   [] TA   [] Neuro   [] Self Care Patient Education: [x] Review HEP    [] Progressed/Changed HEP based on:   [] positioning   [] body mechanics   [] transfers   [] heat/ice application    [] other:      Other Objective/Functional Measures:  Pt arrived to session 20' late modified te accordingly   Initiated running on leif 5.0 5x20\" with equal weight distribution, no fear avoidance or pain c/o  Heel to butt during mt so held deep stretchign      Post Treatment Pain Level (on 0 to 10) scale:   0  / 10     ASSESSMENT  Assessment/Changes in Function:     No increase in pain following session      [] See Progress Note/Recertification   Patient will continue to benefit from skilled PT services to modify and progress therapeutic interventions, address functional mobility deficits, address ROM deficits, address strength deficits, analyze and address soft tissue restrictions, analyze and cue movement patterns, analyze and modify body mechanics/ergonomics, assess and modify postural abnormalities, address imbalance/dizziness and instruct in home and community integration to attain remaining goals.    Progress toward goals / Updated goals:  Return to run program achieved this session        PLAN  []  Upgrade activities as tolerated yes Continue plan of care   []  Discharge due to :    []  Other:      Therapist: Radha Zhang PT    Date: 5/18/2020 Time: 12:50 PM     Future Appointments   Date Time Provider Mariela Devlin   5/18/2020 12:00 PM Shira Romano [facility]   5/20/2020 12:00 PM Leila Navarrete, PT [facility]   5/27/2020  1:00 PM Leila Navarrete, PT [facility]

## 2020-05-20 ENCOUNTER — HOSPITAL ENCOUNTER (OUTPATIENT)
Dept: PHYSICAL THERAPY | Age: 35
Discharge: HOME | End: 2020-05-20
Payer: COMMERCIAL

## 2020-05-20 PROCEDURE — 97110 THERAPEUTIC EXERCISES: CPT | Performed by: PHYSICAL THERAPIST

## 2020-05-20 NOTE — PROGRESS NOTES
Jeanne[address]wed   PHYSICAL THERAPY - DAILY TREATMENT NOTE    Patient Name: Britton Hull        Date: 2020  : 1985   yes Patient  Verified  Visit #:     Insurance: Payor: [employer] / Plan: [facility] [address] HMO/CHOICE PLUS/POS / Product Type: HMO /      In time:  Out time: 6318   Total Treatment Time: 35     Medicare/[employer] Time Tracking (below)   Total Timed Codes (min):  na 1:1 Treatment Time:  na     TREATMENT AREA =  Right knee pain [M25.561]    SUBJECTIVE  Pain Level (on 0 to 10 scale):  0  / 10   Medication Changes/New allergies or changes in medical history, any new surgeries or procedures?    no  If yes, update Summary List   Subjective Functional Status/Changes:  []  No changes reported   Felt good after the last time           OBJECTIVE      30 min Therapeutic Exercise:  [x]  See flow sheet   Rationale:      increase ROM, increase strength, improve coordination, improve balance and increase proprioception to improve the patients ability to return to run     nc min Manual Therapy: Dtm/stretch quad/hs   Rationale:      decrease pain, increase ROM, increase tissue extensibility and decrease trigger points to improve patient's ability to return to run        Billed With/As:   [] TE   [] TA   [] Neuro   [] Self Care Patient Education: [x] Review HEP    [] Progressed/Changed HEP based on:   [] positioning   [] body mechanics   [] transfers   [] heat/ice application    [] other:      Other Objective/Functional Measures:  15 yard run 4x60% speed (noted deceleration with excessive PF vs knee), linear ladder drills (double, 2 in/2 out, 1 in/1out) 3x each  Triple hop with 51\" differential - noted apprehension and excessive forward trunk lean but no pain      Post Treatment Pain Level (on 0 to 10) scale:   0  / 10     ASSESSMENT  Assessment/Changes in Function:     No increase in pain following session      []  See Progress Note/Recertification   Patient will continue to benefit from skilled PT services to modify and progress therapeutic interventions, address functional mobility deficits, address ROM deficits, address strength deficits, analyze and address soft tissue restrictions, analyze and cue movement patterns, analyze and modify body mechanics/ergonomics, assess and modify postural abnormalities, address imbalance/dizziness and instruct in home and community integration to attain remaining goals.    Progress toward goals / Updated goals:  Return to run program achieved this session        PLAN  []  Upgrade activities as tolerated yes Continue plan of care   []  Discharge due to :    []  Other:      Therapist: Lashay Welch PT    Date: 5/20/2020 Time: 12:50 PM     Future Appointments   Date Time Provider Mariela Devlin   5/27/2020  1:00 PM Leno Snu PT [facility]

## 2020-05-27 ENCOUNTER — APPOINTMENT (OUTPATIENT)
Dept: PHYSICAL THERAPY | Age: 35
End: 2020-05-27
Payer: COMMERCIAL

## 2020-06-01 ENCOUNTER — APPOINTMENT (OUTPATIENT)
Dept: PHYSICAL THERAPY | Age: 35
End: 2020-06-01

## 2020-06-08 ENCOUNTER — APPOINTMENT (OUTPATIENT)
Dept: PHYSICAL THERAPY | Age: 35
End: 2020-06-08

## 2020-06-18 ENCOUNTER — APPOINTMENT (OUTPATIENT)
Dept: PHYSICAL THERAPY | Age: 35
End: 2020-06-18

## 2020-06-22 ENCOUNTER — HOSPITAL ENCOUNTER (OUTPATIENT)
Dept: PHYSICAL THERAPY | Age: 35
Discharge: HOME | End: 2020-06-22

## 2020-06-22 NOTE — PROGRESS NOTES
.[facility] [address] THERAPY  [address] [place] [address], [address] [facility] - Phone: [phone]  Fax: [phone]  DISCHARGE NOTE  Patient Name: Arun Lepe : 1985   Treatment/Medical Diagnosis: Right knee pain [M25.561]   Referral Source: Grisel Son*     Date of Initial Visit: 20 Attended Visits: 24 Missed Visits: 6     SUMMARY OF TREATMENT  Pt seen for IE and 23 f/u visits with treatment consisting of therapeutic exercises for knee rom/LE strengthening, manual therapy (prom/mobs/stm), gait/balance training and modalities prn. In addition pt was instructed on hep/activity modification. CURRENT STATUS  Pt has missed the last 7 consecutive appointments. I spoke with him and he stated he was returning to full gym activities with 100% participation. At this time we have agreed to d/c. Unable to perform a formal reassessment. Pt was sent an advanced hep. RECOMMENDATIONS  Self dc due to limited availability to attend appointments   If you have any questions/comments please contact us directly at [phone]. Thank you for allowing us to assist in the care of your patient. Therapist Signature: Erick Edgar DPT, MTC  Date: 2020     Time: 12:49 PM   NOTE TO PHYSICIAN:  PLEASE COMPLETE THE ORDERS BELOW AND FAX TO   [facility]: [phone]  If you are unable to process this request in 24 hours please contact our office: [phone]    ___ I have read the above report and request that my patient continue as recommended.   ___ I have read the above report and request that my patient continue therapy with the following changes/special instructions:_________________________________________________________   ___ I have read the above report and request that my patient be discharged from therapy.      Physician Signature:        Date:       Time:

## 2020-06-25 ENCOUNTER — APPOINTMENT (OUTPATIENT)
Dept: PHYSICAL THERAPY | Age: 35
End: 2020-06-25

## 2020-06-29 ENCOUNTER — APPOINTMENT (OUTPATIENT)
Dept: PHYSICAL THERAPY | Age: 35
End: 2020-06-29

## 2023-07-26 NOTE — PROGRESS NOTES
Per Dr. Ryan Mckee. Salena Greenwood   PHYSICAL THERAPY - DAILY TREATMENT NOTE    Patient Name: Johanny Glasgow        Date: 2020  : 1985   yes Patient  Verified  Visit #:     Insurance: Payor: Amie Parra / Plan: "[employer] HMO/CHOICE PLUS/POS / Product Type: HMO /      In time: 475 Out time: 1213   Total Treatment Time: 42     Medicare/[facility] Time Tracking (below)   Total Timed Codes (min):  na 1:1 Treatment Time:  na     TREATMENT AREA =  Right knee pain [M25.561]    SUBJECTIVE  Pain Level (on 0 to 10 scale):  0  / 10   Medication Changes/New allergies or changes in medical history, any new surgeries or procedures?    no  If yes, update Summary List   Subjective Functional Status/Changes:  []  No changes reported     See pn          OBJECTIVE      30 min Therapeutic Exercise:  [x]  See flow sheet   Rationale:      increase ROM, increase strength, improve coordination, improve balance and increase proprioception to improve the patients ability to return to run     12 min Manual Therapy: Dtm/stretch quad/hs   Rationale:      decrease pain, increase ROM, increase tissue extensibility and decrease trigger points to improve patient's ability to return to run        Billed With/As:   [] TE   [] TA   [] Neuro   [] Self Care Patient Education: [x] Review HEP    [] Progressed/Changed HEP based on:   [] positioning   [] body mechanics   [] transfers   [] heat/ice application    [] other:      Other Objective/Functional Measures:    Depth jump 12\" 2x10 sl forward, 6\" 2x10 lateral all pain free, medial collapse on first 3 reps of lateral that corrected with vcs   See pn     Post Treatment Pain Level (on 0 to 10) scale:   0  / 10     ASSESSMENT  Assessment/Changes in Function:     See pn      []  See Progress Note/Recertification   Patient will continue to benefit from skilled PT services to modify and progress therapeutic interventions, address functional mobility deficits, address ROM deficits, address strength deficits, analyze and address soft tissue restrictions, analyze and cue movement patterns, analyze and modify body mechanics/ergonomics, assess and modify postural abnormalities, address imbalance/dizziness and instruct in home and community integration to attain remaining goals.    Progress toward goals / Updated goals:    See pn      PLAN  []  Upgrade activities as tolerated yes Continue plan of care   []  Discharge due to :    []  Other:      Therapist: Zenia Molina PT    Date: 5/11/2020 Time: 12:50 PM     Future Appointments   Date Time Provider Mariela Devlin   5/13/2020 12:00 PM [facility]   5/18/2020 12:00 PM [facility]   5/20/2020 12:00 PM Gilford Picker, PT [facility]   5/27/2020  1:00 PM Gilford Picker, PT [facility]